# Patient Record
Sex: FEMALE | Race: BLACK OR AFRICAN AMERICAN | Employment: UNEMPLOYED | URBAN - METROPOLITAN AREA
[De-identification: names, ages, dates, MRNs, and addresses within clinical notes are randomized per-mention and may not be internally consistent; named-entity substitution may affect disease eponyms.]

---

## 2021-09-13 ENCOUNTER — HOSPITAL ENCOUNTER (EMERGENCY)
Age: 51
Discharge: HOME | End: 2021-09-16
Attending: EMERGENCY MEDICINE
Payer: MEDICAID

## 2021-09-13 DIAGNOSIS — S00.83XA CONTUSION OF FOREHEAD, INITIAL ENCOUNTER: ICD-10-CM

## 2021-09-13 DIAGNOSIS — R44.0 AUDITORY HALLUCINATIONS: Primary | ICD-10-CM

## 2021-09-13 DIAGNOSIS — F25.9 SCHIZOAFFECTIVE DISORDER, UNSPECIFIED TYPE (HCC): ICD-10-CM

## 2021-09-13 DIAGNOSIS — S60.419A ABRASION OF FINGER OF LEFT HAND, INITIAL ENCOUNTER: ICD-10-CM

## 2021-09-13 LAB
ALBUMIN SERPL-MCNC: 3.8 G/DL (ref 3.5–5)
ALBUMIN/GLOB SERPL: 0.8 {RATIO} (ref 1.2–3.5)
ALP SERPL-CCNC: 73 U/L (ref 50–136)
ALT SERPL-CCNC: 15 U/L (ref 12–65)
AMPHET UR QL SCN: NEGATIVE
ANION GAP SERPL CALC-SCNC: 15 MMOL/L (ref 7–16)
AST SERPL-CCNC: 16 U/L (ref 15–37)
BARBITURATES UR QL SCN: NEGATIVE
BASOPHILS # BLD: 0 K/UL (ref 0–0.2)
BASOPHILS NFR BLD: 0 % (ref 0–2)
BENZODIAZ UR QL: NEGATIVE
BILIRUB SERPL-MCNC: 0.3 MG/DL (ref 0.2–1.1)
BUN SERPL-MCNC: 19 MG/DL (ref 6–23)
CALCIUM SERPL-MCNC: 10.5 MG/DL (ref 8.3–10.4)
CANNABINOIDS UR QL SCN: NEGATIVE
CHLORIDE SERPL-SCNC: 102 MMOL/L (ref 98–107)
CO2 SERPL-SCNC: 26 MMOL/L (ref 21–32)
COCAINE UR QL SCN: NEGATIVE
CREAT SERPL-MCNC: 0.64 MG/DL (ref 0.6–1)
DIFFERENTIAL METHOD BLD: ABNORMAL
EOSINOPHIL # BLD: 0 K/UL (ref 0–0.8)
EOSINOPHIL NFR BLD: 0 % (ref 0.5–7.8)
ERYTHROCYTE [DISTWIDTH] IN BLOOD BY AUTOMATED COUNT: 13 % (ref 11.9–14.6)
GLOBULIN SER CALC-MCNC: 4.9 G/DL (ref 2.3–3.5)
GLUCOSE SERPL-MCNC: 115 MG/DL (ref 65–100)
HCG UR QL: NEGATIVE
HCT VFR BLD AUTO: 46.9 % (ref 35.8–46.3)
HGB BLD-MCNC: 14.7 G/DL (ref 11.7–15.4)
IMM GRANULOCYTES # BLD AUTO: 0 K/UL (ref 0–0.5)
IMM GRANULOCYTES NFR BLD AUTO: 0 % (ref 0–5)
LYMPHOCYTES # BLD: 3 K/UL (ref 0.5–4.6)
LYMPHOCYTES NFR BLD: 36 % (ref 13–44)
MCH RBC QN AUTO: 32.4 PG (ref 26.1–32.9)
MCHC RBC AUTO-ENTMCNC: 31.3 G/DL (ref 31.4–35)
MCV RBC AUTO: 103.3 FL (ref 79.6–97.8)
METHADONE UR QL: NEGATIVE
MONOCYTES # BLD: 0.9 K/UL (ref 0.1–1.3)
MONOCYTES NFR BLD: 10 % (ref 4–12)
NEUTS SEG # BLD: 4.4 K/UL (ref 1.7–8.2)
NEUTS SEG NFR BLD: 53 % (ref 43–78)
NRBC # BLD: 0 K/UL (ref 0–0.2)
OPIATES UR QL: NEGATIVE
PCP UR QL: NEGATIVE
PLATELET # BLD AUTO: 192 K/UL (ref 150–450)
PMV BLD AUTO: 9.7 FL (ref 9.4–12.3)
POTASSIUM SERPL-SCNC: 4 MMOL/L (ref 3.5–5.1)
PROT SERPL-MCNC: 8.7 G/DL (ref 6.3–8.2)
RBC # BLD AUTO: 4.54 M/UL (ref 4.05–5.2)
SODIUM SERPL-SCNC: 143 MMOL/L (ref 136–145)
VALPROATE SERPL-MCNC: 86 UG/ML (ref 50–100)
WBC # BLD AUTO: 8.3 K/UL (ref 4.3–11.1)

## 2021-09-13 PROCEDURE — 80164 ASSAY DIPROPYLACETIC ACD TOT: CPT

## 2021-09-13 PROCEDURE — 80307 DRUG TEST PRSMV CHEM ANLYZR: CPT

## 2021-09-13 PROCEDURE — 85025 COMPLETE CBC W/AUTO DIFF WBC: CPT

## 2021-09-13 PROCEDURE — 80053 COMPREHEN METABOLIC PANEL: CPT

## 2021-09-13 PROCEDURE — 81003 URINALYSIS AUTO W/O SCOPE: CPT

## 2021-09-13 PROCEDURE — 99285 EMERGENCY DEPT VISIT HI MDM: CPT

## 2021-09-13 PROCEDURE — 81025 URINE PREGNANCY TEST: CPT

## 2021-09-13 RX ORDER — AMANTADINE HYDROCHLORIDE 100 MG/1
100 CAPSULE, GELATIN COATED ORAL EVERY 12 HOURS
Status: DISCONTINUED | OUTPATIENT
Start: 2021-09-14 | End: 2021-09-16 | Stop reason: HOSPADM

## 2021-09-13 RX ORDER — FAMOTIDINE 20 MG/1
20 TABLET, FILM COATED ORAL 2 TIMES DAILY
COMMUNITY

## 2021-09-13 RX ORDER — METOPROLOL SUCCINATE 25 MG/1
25 TABLET, EXTENDED RELEASE ORAL DAILY
COMMUNITY

## 2021-09-13 RX ORDER — INSULIN GLARGINE 100 [IU]/ML
10 INJECTION, SOLUTION SUBCUTANEOUS DAILY
Status: DISCONTINUED | OUTPATIENT
Start: 2021-09-14 | End: 2021-09-16 | Stop reason: HOSPADM

## 2021-09-13 RX ORDER — AMANTADINE HYDROCHLORIDE 100 MG/1
100 CAPSULE, GELATIN COATED ORAL 2 TIMES DAILY
COMMUNITY

## 2021-09-13 RX ORDER — DIVALPROEX SODIUM 500 MG/1
750 TABLET, DELAYED RELEASE ORAL 2 TIMES DAILY
COMMUNITY

## 2021-09-13 RX ORDER — QUETIAPINE FUMARATE 400 MG/1
400 TABLET, FILM COATED ORAL 2 TIMES DAILY
COMMUNITY

## 2021-09-13 RX ORDER — DOCUSATE SODIUM 100 MG/1
100 CAPSULE, LIQUID FILLED ORAL
COMMUNITY

## 2021-09-13 RX ORDER — RISPERIDONE 0.5 MG/1
0.5 TABLET, FILM COATED ORAL
COMMUNITY

## 2021-09-13 RX ORDER — METOPROLOL SUCCINATE 50 MG/1
25 TABLET, EXTENDED RELEASE ORAL DAILY
Status: DISCONTINUED | OUTPATIENT
Start: 2021-09-14 | End: 2021-09-16 | Stop reason: HOSPADM

## 2021-09-13 RX ORDER — OMEPRAZOLE 20 MG/1
20 CAPSULE, DELAYED RELEASE ORAL 2 TIMES DAILY
COMMUNITY

## 2021-09-13 RX ORDER — LANOLIN ALCOHOL/MO/W.PET/CERES
500 CREAM (GRAM) TOPICAL DAILY
COMMUNITY

## 2021-09-13 RX ORDER — ERGOCALCIFEROL 1.25 MG/1
50000 CAPSULE ORAL
COMMUNITY

## 2021-09-13 RX ORDER — CLONAZEPAM 0.5 MG/1
0.5 TABLET ORAL
Status: DISCONTINUED | OUTPATIENT
Start: 2021-09-13 | End: 2021-09-16 | Stop reason: HOSPADM

## 2021-09-13 RX ORDER — QUETIAPINE FUMARATE 100 MG/1
400 TABLET, FILM COATED ORAL 2 TIMES DAILY
Status: DISCONTINUED | OUTPATIENT
Start: 2021-09-13 | End: 2021-09-16 | Stop reason: HOSPADM

## 2021-09-13 RX ORDER — CLONAZEPAM 0.5 MG/1
1 TABLET ORAL 2 TIMES DAILY
COMMUNITY

## 2021-09-13 RX ORDER — CALCIUM CARBONATE 200(500)MG
1 TABLET,CHEWABLE ORAL
COMMUNITY

## 2021-09-13 RX ORDER — FAMOTIDINE 20 MG/1
20 TABLET, FILM COATED ORAL 2 TIMES DAILY
Status: DISCONTINUED | OUTPATIENT
Start: 2021-09-14 | End: 2021-09-16 | Stop reason: HOSPADM

## 2021-09-13 RX ORDER — FERROUS SULFATE 325(65) MG
325 TABLET, DELAYED RELEASE (ENTERIC COATED) ORAL
COMMUNITY

## 2021-09-13 RX ORDER — ROSUVASTATIN CALCIUM 10 MG/1
10 TABLET, COATED ORAL
COMMUNITY

## 2021-09-13 RX ORDER — RISPERIDONE 1 MG/1
0.5 TABLET, FILM COATED ORAL DAILY
Status: DISCONTINUED | OUTPATIENT
Start: 2021-09-14 | End: 2021-09-16 | Stop reason: HOSPADM

## 2021-09-13 NOTE — ED TRIAGE NOTES
Pt [facility] from [address], states that she was upset with her staff and started banging her head on wall and scratching her hand. Staff states patient needs psych evaluation.

## 2021-09-13 NOTE — ED PROVIDER NOTES
79-year-old female brought in by caretaker. She is in a group home. Diagnoses include hypertension diabetes and schizoaffective disorder. Patient comes in today because of agitation. She states she was screaming and banging her head on the wall because she did not get her way. Caretaker with the patient states that she has had other aggressive behavior as far as throwing things, trying to lock staff out and threatening behavior. No changes in medications recently. Patient denies suicidal ideations or attempts. There is some question of the patient having auditory hallucinations with demons telling her to do these things. Caretaker with the patient states as far as psychoactive medications, she is on Klonopin, Depakote, Seroquel, Risperdal, amantadine. Old records reviewed. Patient is in another hospital for self-induced abrasion to left dorsum of the hand yesterday which patient admits to doing because she was frustrated. Old records reveals another emergency department visit approximately 6 months ago due to agitation at that time as well. The history is provided by the patient. Mental Health Problem   The current episode started 1 to 2 hours ago. The problem has been gradually improving. Associated symptoms include agitation, hallucinations (Possible auditory), self-injury and violence. Mental status baseline is normal.  Her past medical history is significant for diabetes and hypertension. Her past medical history does not include seizures, CVA, dementia or heart disease. No past medical history on file. No past surgical history on file. No family history on file.     Social History     Socioeconomic History    Marital status: Not on file     Spouse name: Not on file    Number of children: Not on file    Years of education: Not on file    Highest education level: Not on file   Occupational History    Not on file   Tobacco Use    Smoking status: Not on file   Substance and Sexual Activity    Alcohol use: Not on file    Drug use: Not on file    Sexual activity: Not on file   Other Topics Concern    Not on file   Social History Narrative    Not on file     Social Determinants of Health     Financial Resource Strain:     Difficulty of Paying Living Expenses:    Food Insecurity:     Worried About Running Out of Food in the Last Year:     [address] in the Last Year:    Transportation Needs:     Lack of Transportation (Medical):  Lack of Transportation (Non-Medical):    Physical Activity:     Days of Exercise per Week:     Minutes of Exercise per Session:    Stress:     Feeling of Stress :    Social Connections:     Frequency of Communication with Friends and Family:     Frequency of Social Gatherings with Friends and Family:     Attends [employer] Services:     Active Member of Clubs or Organizations:     Attends Club or Organization Meetings:     Marital Status:    Intimate Partner Violence:     Fear of Current or Ex-Partner:     Emotionally Abused:     Physically Abused:     Sexually Abused: ALLERGIES: Patient has no known allergies. Review of Systems   Constitutional: Negative for chills and fever. Respiratory: Negative for cough and shortness of breath. Cardiovascular: Negative for chest pain and palpitations. Gastrointestinal: Negative for abdominal pain, diarrhea and vomiting. Genitourinary: Negative for flank pain and frequency. Skin: Negative for color change. Psychiatric/Behavioral: Positive for agitation, behavioral problems, decreased concentration, hallucinations (Possible auditory) and self-injury. Negative for suicidal ideas. All other systems reviewed and are negative. Vitals:    09/13/21 1714   BP: 128/79   Pulse: (!) 119   Resp: 18   Temp: 99.4 °F (37.4 °C)   SpO2: 96%   Weight: 54.4 kg (120 lb)   Height: 5' 3\" (1.6 m)            Physical Exam  Vitals and nursing note reviewed.    Constitutional:       General: She is not in acute distress. Appearance: She is well-developed. HENT:      Head: Normocephalic and atraumatic. Right Ear: External ear normal.      Left Ear: External ear normal.      Mouth/Throat:      Pharynx: No oropharyngeal exudate. Eyes:      General: No scleral icterus. Conjunctiva/sclera: Conjunctivae normal.      Pupils: Pupils are equal, round, and reactive to light. Cardiovascular:      Rate and Rhythm: Normal rate and regular rhythm. Heart sounds: No murmur heard. Pulmonary:      Effort: No respiratory distress. Breath sounds: Normal breath sounds. Skin:     General: Skin is warm and dry. Neurological:      Mental Status: She is alert and oriented to person, place, and time. Gait: Gait normal.      Comments: Nl speech   Psychiatric:         Attention and Perception: Attention normal.         Mood and Affect: Affect is flat. Speech: Speech normal.         Behavior: Behavior is cooperative. Thought Content: Thought content does not include suicidal ideation. Thought content does not include suicidal plan. Comments: No obvious hallucinations noted. MDM  Number of Diagnoses or Management Options  Diagnosis management comments: Schizoaffective patient with some behavioral issues including self injury that is not suicidal in nature. Perhaps some danger to others as well. Will attempt psychiatric interview. Screen for pregnancy or electrolyte abnormalities.        Amount and/or Complexity of Data Reviewed  Clinical lab tests: ordered and reviewed  Discuss the patient with other providers: yes    Risk of Complications, Morbidity, and/or Mortality  Presenting problems: moderate  Diagnostic procedures: minimal  Management options: low    Patient Progress  Patient progress: stable         Procedures    Results Include:    Recent Results (from the past 24 hour(s))   CBC WITH AUTOMATED DIFF    Collection Time: 09/13/21  6:22 PM   Result Value Ref Range    WBC 8.3 4.3 - 11.1 K/uL    RBC 4.54 4.05 - 5.2 M/uL    HGB 14.7 11.7 - 15.4 g/dL    HCT 46.9 (H) 35.8 - 46.3 %    .3 (H) 79.6 - 97.8 FL    MCH 32.4 26.1 - 32.9 PG    MCHC 31.3 (L) 31.4 - 35.0 g/dL    RDW 13.0 11.9 - 14.6 %    PLATELET 912 759 - 581 K/uL    MPV 9.7 9.4 - 12.3 FL    ABSOLUTE NRBC 0.00 0.0 - 0.2 K/uL    DF AUTOMATED      NEUTROPHILS 53 43 - 78 %    LYMPHOCYTES 36 13 - 44 %    MONOCYTES 10 4.0 - 12.0 %    EOSINOPHILS 0 (L) 0.5 - 7.8 %    BASOPHILS 0 0.0 - 2.0 %    IMMATURE GRANULOCYTES 0 0.0 - 5.0 %    ABS. NEUTROPHILS 4.4 1.7 - 8.2 K/UL    ABS. LYMPHOCYTES 3.0 0.5 - 4.6 K/UL    ABS. MONOCYTES 0.9 0.1 - 1.3 K/UL    ABS. EOSINOPHILS 0.0 0.0 - 0.8 K/UL    ABS. BASOPHILS 0.0 0.0 - 0.2 K/UL    ABS. IMM. GRANS. 0.0 0.0 - 0.5 K/UL   METABOLIC PANEL, COMPREHENSIVE    Collection Time: 09/13/21  6:22 PM   Result Value Ref Range    Sodium 143 136 - 145 mmol/L    Potassium 4.0 3.5 - 5.1 mmol/L    Chloride 102 98 - 107 mmol/L    CO2 26 21 - 32 mmol/L    Anion gap 15 7 - 16 mmol/L    Glucose 115 (H) 65 - 100 mg/dL    BUN 19 6 - 23 MG/DL    Creatinine 0.64 0.6 - 1.0 MG/DL    GFR est AA >60 >60 ml/min/1.73m2    GFR est non-AA >60 >60 ml/min/1.73m2    Calcium 10.5 (H) 8.3 - 10.4 MG/DL    Bilirubin, total 0.3 0.2 - 1.1 MG/DL    ALT (SGPT) 15 12 - 65 U/L    AST (SGOT) 16 15 - 37 U/L    Alk.  phosphatase 73 50 - 136 U/L    Protein, total 8.7 (H) 6.3 - 8.2 g/dL    Albumin 3.8 3.5 - 5.0 g/dL    Globulin 4.9 (H) 2.3 - 3.5 g/dL    A-G Ratio 0.8 (L) 1.2 - 3.5     DRUG SCREEN, URINE    Collection Time: 09/13/21  6:22 PM   Result Value Ref Range    PCP(PHENCYCLIDINE) Negative      BENZODIAZEPINES Negative      COCAINE Negative      AMPHETAMINES Negative      METHADONE Negative      THC (TH-CANNABINOL) Negative      OPIATES Negative      BARBITURATES Negative     VALPROIC ACID    Collection Time: 09/13/21  6:22 PM   Result Value Ref Range    Valproic acid 86 50 - 100 ug/ml   HCG URINE, QL. - POC    Collection Time: 09/13/21  6:35 PM   Result Value Ref Range    Pregnancy test,urine (POC) Negative NEG       Psychiatry is interviewed the patient and felt she is a danger to self and others. Patient still have hallucinations. Recommendations of involuntary hospitalization and medication recommendations listed. Medications from doctor's office listed. Seems to be a conflict and the psychiatric doctor recommended continuing previous medications but the dose of Depakote was different     Current Outpatient Medications   Medication Sig Dispense Refill    amantadine (SYMMETREL) 100 mg capsule Take 100 mg 2 (two) times a day.  blood sugar diagnostic by Miscellaneous route 2 (two) times a week Test strips of patient choice. 90 days supply 3    blood-glucose meter kit by Miscellaneous route 2 (two) times a week. Check BS twice weekly on Wednesday and Friday. Glucometer of pt choice. 1 each 0    calcium carbonate (Tums) 200 mg calcium (500 mg) chewable tablet Take 1 tablet (500 mg) by mouth 2 (two) times a day as needed for indigestion or heartburn 90 tablet 3    clonazePAM (KlonoPIN) 1 mg tablet Take 1 mg by mouth 2 (two) times a day.     cyanocobalamin (vitamin B-12) 500 mcg tablet Take 1 tablet (500 mcg) by mouth daily 90 tablet 3    dapagliflozin (Farxiga) 10 mg Take 10 mg by mouth daily 90 tablet 3    divalproex (DEPAKOTE) 500 mg EC tablet Take 750 mg by mouth 2 (two) times a day        docusate sodium (COLACE) 100 mg capsule Take 1 capsule (100 mg) daily as needed for constipation 90 capsule 3    emollient combination no.72 (Eucerin Intensive Repair) Apply topically 3 (three) times a day as needed (dry skin) 750 mL 3    ergocalciferol (Vitamin D2) 1,250 mcg (50,000 unit) capsule Take 1 capsule (50,000 Units) every 14 (fourteen) days 6 capsule 3    famotidine (Pepcid) 20 mg tablet Take 1 tablet (20 mg) by mouth 2 (two) times a day 180 tablet 3    ferrous sulfate 325 mg (65 mg iron) tablet Take 1 tablet (325 mg) by mouth daily 90 tablet 3    honey 80% (MEDIHONEY) 80 % topical gel Apply topically daily as needed (wound/skin tear) 44 mL 3    magnesium citrate 1.745 g/oz solution Per colonoscopy instructions 2 Bottle 0    metoprolol (TOPROL-XL) 25 mg 24 hr tablet Take 1 tablet (25 mg) by mouth daily 90 tablet 3    omeprazole (PriLOSEC) 20 MG capsule Take 1 capsule (20 mg) by mouth 2 (two) times a day 180 capsule 3    polyethylene glycol (Gavilyte-C) 240-22.72-6.72 -5.84 gram solution As per instructions 1 Bottle 0    polyethylene glycol (MIRALAX) 17 gram/dose powder Take 235 g by mouth once for 1 dose As per colonoscopy prep instructions 235 g 0    QUEtiapine (SEROquel) 400 MG tablet Take 400 mg by mouth 2 (two) times a day.     risperiDONE (RisperDAL) 0.5 mg tablet Take by mouth    rosuvastatin (Crestor) 10 MG tablet Take 1 tablet (10 mg) by mouth nightly 90 tablet 3    SITagliptin (JANUVIA) 100 mg tablet Take 1 tablet (100 mg) by mouth daily 90 tablet 3

## 2021-09-14 LAB
GLUCOSE BLD STRIP.AUTO-MCNC: 114 MG/DL (ref 65–100)
GLUCOSE BLD STRIP.AUTO-MCNC: 93 MG/DL (ref 65–100)
SERVICE CMNT-IMP: ABNORMAL
SERVICE CMNT-IMP: NORMAL

## 2021-09-14 PROCEDURE — 82962 GLUCOSE BLOOD TEST: CPT

## 2021-09-14 PROCEDURE — 74011636637 HC RX REV CODE- 636/637: Performed by: EMERGENCY MEDICINE

## 2021-09-14 PROCEDURE — 74011250637 HC RX REV CODE- 250/637: Performed by: EMERGENCY MEDICINE

## 2021-09-14 RX ADMIN — DIVALPROEX SODIUM 750 MG: 500 TABLET, DELAYED RELEASE ORAL at 02:07

## 2021-09-14 RX ADMIN — AMANTADINE HYDROCHLORIDE 100 MG: 100 CAPSULE, GELATIN COATED ORAL at 02:06

## 2021-09-14 RX ADMIN — FAMOTIDINE 20 MG: 20 TABLET, FILM COATED ORAL at 08:51

## 2021-09-14 RX ADMIN — CLONAZEPAM 0.5 MG: 0.5 TABLET ORAL at 00:16

## 2021-09-14 RX ADMIN — RISPERIDONE 0.5 MG: 1 TABLET ORAL at 08:51

## 2021-09-14 RX ADMIN — DIVALPROEX SODIUM 750 MG: 500 TABLET, DELAYED RELEASE ORAL at 08:51

## 2021-09-14 RX ADMIN — AMANTADINE HYDROCHLORIDE 100 MG: 100 CAPSULE, GELATIN COATED ORAL at 08:51

## 2021-09-14 RX ADMIN — QUETIAPINE FUMARATE 400 MG: 100 TABLET ORAL at 00:16

## 2021-09-14 RX ADMIN — QUETIAPINE FUMARATE 400 MG: 100 TABLET ORAL at 08:50

## 2021-09-14 RX ADMIN — INSULIN GLARGINE 10 UNITS: 100 INJECTION, SOLUTION SUBCUTANEOUS at 09:02

## 2021-09-14 RX ADMIN — METOPROLOL SUCCINATE 25 MG: 50 TABLET, EXTENDED RELEASE ORAL at 08:51

## 2021-09-14 NOTE — PROGRESS NOTES
[address]                                                                                                 PATIENT INFORMATION   Patient Name: Nicole Berger: [de-identified] Patient MRN: [id number]   Address: [address] [place] [address] [address] [address] Patient CSN: [id number]      Roman Catholic: Mu-ism   Sex: Female Marital Status:    : 1970 Age:   54 yrs   Home Phone: [phone]  Mobile Phone:   [phone]        Race: BLACK/ Employer:   Not Employed   Language: ENGLISH Admitted/Arrived From:   Other [[id number]]   ADMISSION INFORMATION   Admit Date: 2021 Admit Time:  5:36 PM   Patient Class: Emergency Service: Emergency   Admit Source: Other type of health car* Admit Type: EMERGENCY   Admitting Provider:   Attending Provider: Araceli Chapa   Unit: [facility] Emergency Dept Room/Bed: ER15/15    Admission Diagnosis:  and codes:      Emergency Complaint: Head Injury (EMS)                Discharge Date:   Discharge Time:     GUARANTOR INFORMATION   Name:  Lorena Toney Address: [address] [place] [address]  Rel:  Self   Phone: [address] [facility], [address] : 1970   EMERGENCY CONTACTS   Name: Yue Lay:  Mobile:    [phone] Rel: Brother   COVERAGE INFORMATION   Primary Insurance:   [address] [place] [address] Rd: Silviano Crawford   Plan Name: [id number] Cindy Davis Pt Rel to Subscriber: Self [[id number]]   Claim Address: [place] [address] Sex: Female      Policy #:  [id number]    Group #:   Group Name:       Auth #: N/A Ins Phone:         Secondary Insurance:   Subscriber:     Plan Name:   Pt Rel to Subscriber:     Claim Address: NA Sex:       Policy #: N/A    Group #: N/A Group Name: N/A   Auth #: N/A Ins Phone:         Accident Date:    Accident Type:     PROVIDER INFORMATION   PCP:         Caridad Duran MD PCP Phone:  [phone]   Referring Prov:   No ref.  provider found Referring Phone:  Referring Fax:  N/A      Advanced Directive:  Not Received Research:     Lab Client:   Enrollment Status:          Printed on 21 10:22 AM Page      CM met with patient at bedside. Patient is alert and oriented. Patient denies SI/HI. Denies wanting to hurt herself, despite injuries on her arm and head. Patient states she is feeling good right now. CM spoke with group home leader Edil Krishnamurthy, patient was recently dc from [address]. Patient has a hx of schizoaffective disorder and IDD. Patient has been living in a group home for several years, [facility] ([phone], [address] Patient has a  with [place] [address] ([facility] [phone]) Parents are , no children, no spouse. Patient's sees a psychiatrist through [address] (CM unable to reach MD). Referral sent to   [facility] No available beds  [facility] VM left   [facility] No beds  [facility] Referral has been not reviewed   [facility] left   [facility] No Medicaid beds  Patient does not have Medicare. Patient has no hx of IP placement. Patient is able to return to group home once medically stable. CM to continue to follow.        Care Management Interventions  Transition of Care Consult (CM Consult): Discharge Planning  Discharge Durable Medical Equipment: No  Physical Therapy Consult: No  Occupational Therapy Consult: No  Speech Therapy Consult: No  Support Systems: /, Adult Group Home  Confirm Follow Up Transport: Family  The Plan for Transition of Care is Related to the Following Treatment Goals : Group Home  The Patient and/or Patient Representative was Provided with a Choice of Provider and Agrees with the Discharge Plan?: Yes  Name of the Patient Representative Who was Provided with a Choice of Provider and Agrees with the Discharge Plan: Patient & caregiver  Freedom of Choice List was Provided with Basic Dialogue that Supports the Patient's Individualized Plan of Care/Goals, Treatment Preferences and Shares the Quality Data Associated with the Providers?: Yes  Discharge Location  Discharge Placement: Group home,i

## 2021-09-15 LAB
GLUCOSE BLD STRIP.AUTO-MCNC: 117 MG/DL (ref 65–100)
GLUCOSE BLD STRIP.AUTO-MCNC: 156 MG/DL (ref 65–100)
SERVICE CMNT-IMP: ABNORMAL
SERVICE CMNT-IMP: ABNORMAL

## 2021-09-15 PROCEDURE — 82962 GLUCOSE BLOOD TEST: CPT

## 2021-09-15 PROCEDURE — 74011636637 HC RX REV CODE- 636/637: Performed by: EMERGENCY MEDICINE

## 2021-09-15 PROCEDURE — 74011250637 HC RX REV CODE- 250/637: Performed by: EMERGENCY MEDICINE

## 2021-09-15 RX ADMIN — AMANTADINE HYDROCHLORIDE 100 MG: 100 CAPSULE, GELATIN COATED ORAL at 02:49

## 2021-09-15 RX ADMIN — FAMOTIDINE 20 MG: 20 TABLET, FILM COATED ORAL at 18:21

## 2021-09-15 RX ADMIN — QUETIAPINE FUMARATE 400 MG: 100 TABLET ORAL at 21:44

## 2021-09-15 RX ADMIN — AMANTADINE HYDROCHLORIDE 100 MG: 100 CAPSULE, GELATIN COATED ORAL at 21:42

## 2021-09-15 RX ADMIN — AMANTADINE HYDROCHLORIDE 100 MG: 100 CAPSULE, GELATIN COATED ORAL at 09:58

## 2021-09-15 RX ADMIN — QUETIAPINE FUMARATE 400 MG: 100 TABLET ORAL at 09:58

## 2021-09-15 RX ADMIN — RISPERIDONE 0.5 MG: 1 TABLET ORAL at 09:58

## 2021-09-15 RX ADMIN — METOPROLOL SUCCINATE 25 MG: 50 TABLET, EXTENDED RELEASE ORAL at 09:58

## 2021-09-15 RX ADMIN — DIVALPROEX SODIUM 750 MG: 500 TABLET, DELAYED RELEASE ORAL at 21:42

## 2021-09-15 RX ADMIN — FAMOTIDINE 20 MG: 20 TABLET, FILM COATED ORAL at 09:58

## 2021-09-15 RX ADMIN — DIVALPROEX SODIUM 750 MG: 500 TABLET, DELAYED RELEASE ORAL at 02:48

## 2021-09-15 RX ADMIN — DIVALPROEX SODIUM 750 MG: 500 TABLET, DELAYED RELEASE ORAL at 09:57

## 2021-09-15 RX ADMIN — CLONAZEPAM 0.5 MG: 0.5 TABLET ORAL at 21:44

## 2021-09-15 RX ADMIN — INSULIN GLARGINE 10 UNITS: 100 INJECTION, SOLUTION SUBCUTANEOUS at 09:57

## 2021-09-15 NOTE — PROGRESS NOTES
Problem: Anxiety-Behavioral Health (Adult/Pediatric)  Goal: *STG: Participates in treatment plan  Outcome: Progressing Towards Goal  Goal: *STG: Remains safe in hospital  Outcome: Progressing Towards Goal  Goal: *STG: Seeks staff when feelings of anxiety and fear arise  Outcome: Progressing Towards Goal  Goal: *STG: Attends activities and groups  Outcome: Progressing Towards Goal  Goal: *STG: Demonstrates decrease in ritualistic behavior  Outcome: Progressing Towards Goal  Goal: *STG: Demonstrates effective anxiety reduction strategies  Outcome: Progressing Towards Goal  Goal: *STG/LTG: Trigger identification  Outcome: Progressing Towards Goal  Goal: *STG/LTG: Complies with medication therapy  Outcome: Progressing Towards Goal  Goal: *LTG:  Verbalizes understanding of personal adaptive level of functioning  Outcome: Progressing Towards Goal  Goal: Interventions  Outcome: Progressing Towards Goal     Problem: Patient Education: Go to Patient Education Activity  Goal: Patient/Family Education  Outcome: Progressing Towards Goal

## 2021-09-15 NOTE — ED NOTES
Constant Observer Yes - Name: .   Constant Observer Oriented YES   High risk patients are in line of sight at all times Yes   Excess equipment/medical supplies not necessary for the care of the patient removed Yes   All sharp or dangerous objects are removed from room: including but not limited to belts, pens & pencils, needles, medications, cosmetics, lighters, matches, nail files, watches, necklaces, glass objects, razors, razor blades, knives, aerosol sprays, drawstring pants, shoes, cords (telephone, call bells, etc.) cleaning wipes or other cleaning items, aluminum cans, not permanently attached wall décor Yes   Telephone/cell phone removed as well as TV remote (batteries can be swallowed) Yes   Patient belongings removed and labeled at nurses station Yes   Excess linen is removed from room Yes   All plastic bags are removed from the room and replaced with paper trash bags Yes   Patient is in paper scrubs or appropriate gown and using hospital socks with rubber soles Yes   No metal, hard eating utensils or hard plates are on meal tray Yes   Remove all cleaning agents used by [employer] Yes   If Crucifix is hanging on a nail, remove Crucifix as well as the nail Yes       *If any question above is answered \"No,\" documentation is required.

## 2021-09-15 NOTE — ED NOTES
[facility] Psychiatric RECHECK NOTE for 9/15/2021  Arrival Date/Time: 2021  5:36 PM      Stacey Garcia  MRN: [id number]    YOB: 1970   54 y.o. female    [facility] EMERGENCY DEPT ER15/15  Seen on 9/15/2021 @ 11:01 AM        she is on papers. Commitment Papers  on: 21    she has completed a tele-psych evaluation. 21     Stacey Garcia is a 54 y.o. female here for agitation, self injury, hallucinations. Objective:   Vitals:    21 0051 21 0851 09/15/21 0956 09/15/21 0957   BP: (!) 103/58 129/79 135/89 135/89   Pulse: (!) 110 (!) 101 (!) 118 (!) 118   Resp: 16  17    Temp:  97.6 °F (36.4 °C) 98.2 °F (36.8 °C)    SpO2: 97% 99% 99%        Recent Labs:   Recent Labs     21  1822      K 4.0      CO2 26   BUN 19   CREA 0.64   CA 10.5*   *      No lab exists for component: UDS,  [place]    Current Facility-Administered Medications   Medication Dose Route Frequency    divalproex DR (DEPAKOTE) tablet 750 mg  750 mg Oral Q12H    clonazePAM (KlonoPIN) tablet 0.5 mg  0.5 mg Oral QHS    amantadine HCL (SYMMETREL) capsule 100 mg  100 mg Oral Q12H    famotidine (PEPCID) tablet 20 mg  20 mg Oral BID    metoprolol succinate (TOPROL-XL) XL tablet 25 mg  25 mg Oral DAILY    QUEtiapine (SEROquel) tablet 400 mg  400 mg Oral BID    risperiDONE (RisperDAL) tablet 0.5 mg  0.5 mg Oral DAILY    insulin glargine (LANTUS) injection 10 Units  10 Units SubCUTAneous DAILY     Current Outpatient Medications   Medication Sig    amantadine HCL (SYMMETREL) 100 mg capsule Take 100 mg by mouth two (2) times a day.  calcium carbonate (TUMS) 200 mg calcium (500 mg) chew Take 1 Tablet by mouth two (2) times daily as needed.  clonazePAM (KlonoPIN) 0.5 mg tablet Take 1 mg by mouth two (2) times a day.  cyanocobalamin (Vitamin B-12) 500 mcg tablet Take 500 mcg by mouth daily.  divalproex DR (DEPAKOTE) 500 mg tablet Take 750 mg by mouth two (2) times a day.     docusate sodium (Colace) 100 mg capsule Take 100 mg by mouth two (2) times daily as needed for Constipation.  ergocalciferol (Vitamin D2) 1,250 mcg (50,000 unit) capsule Take 50,000 Units by mouth every fourteen (14) days.  famotidine (Pepcid) 20 mg tablet Take 20 mg by mouth two (2) times a day.  ferrous sulfate (IRON) 325 mg (65 mg iron) EC tablet Take 325 mg by mouth three (3) times daily (with meals).  metoprolol succinate (TOPROL-XL) 25 mg XL tablet Take 25 mg by mouth daily.  omeprazole (PRILOSEC) 20 mg capsule Take 20 mg by mouth two (2) times a day.  QUEtiapine (SEROqueL) 400 mg tablet Take 400 mg by mouth two (2) times a day.  risperiDONE (RisperDAL) 0.5 mg tablet Take 0.5 mg by mouth nightly.  rosuvastatin (Crestor) 10 mg tablet Take 10 mg by mouth nightly.  SITagliptin (Januvia) 100 mg tablet Take 100 mg by mouth daily.  dapagliflozin (Farxiga) 10 mg tab tablet Take 10 mg by mouth daily. Assessment and Plan:  Schizoaffective disorder with increased agitation and self injury. Doing well on medications in ED. No complaints today. Awaiting records from her psychiatrist. Plan to dc back to group home with possible one on one care.     Kiran Stern MD; 9/15/2021 @11:01 AM ===============

## 2021-09-15 NOTE — PROGRESS NOTES
Care Management Interventions  Transition of Care Consult (CM Consult): Discharge Planning  Discharge Durable Medical Equipment: No  Physical Therapy Consult: No  Occupational Therapy Consult: No  Speech Therapy Consult: No  Support Systems: /, Adult Group Home  Confirm Follow Up Transport: Family  The Plan for Transition of Care is Related to the Following Treatment Goals : Group Home  The Patient and/or Patient Representative was Provided with a Choice of Provider and Agrees with the Discharge Plan?: Yes  Name of the Patient Representative Who was Provided with a Choice of Provider and Agrees with the Discharge Plan: Patient & caregiver  Freedom of Choice List was Provided with Basic Dialogue that Supports the Patient's Individualized Plan of Care/Goals, Treatment Preferences and Shares the Quality Data Associated with the Providers?: Yes  Discharge Location  Discharge Placement: Group home      FLACO spoke with DEANNA- Morelia Talavera at psychiatrist office of Dr. Timmy Maloney. Patient is well known to the practice, they have been treating her for several years. Patient has had no med adjustments since December 2020. NP reports patient having passive SI thoughts but not acted on thoughts. Patient's diet was recently changed to Diabetic with insulin regimen, this has caused the patient distress. Patient is normally very cooperative, this behavior is inconsistent with her usual behavior. FLACO requested records from Morelia Talavera NP. MAC faxed to NP. CM to continue to follow.

## 2021-09-16 VITALS
HEIGHT: 63 IN | HEART RATE: 98 BPM | WEIGHT: 120 LBS | BODY MASS INDEX: 21.26 KG/M2 | TEMPERATURE: 97.7 F | OXYGEN SATURATION: 98 % | RESPIRATION RATE: 16 BRPM | DIASTOLIC BLOOD PRESSURE: 71 MMHG | SYSTOLIC BLOOD PRESSURE: 108 MMHG

## 2021-09-16 LAB
GLUCOSE BLD STRIP.AUTO-MCNC: 123 MG/DL (ref 65–100)
SERVICE CMNT-IMP: ABNORMAL

## 2021-09-16 PROCEDURE — 82962 GLUCOSE BLOOD TEST: CPT

## 2021-09-16 PROCEDURE — 74011250637 HC RX REV CODE- 250/637: Performed by: EMERGENCY MEDICINE

## 2021-09-16 PROCEDURE — 74011636637 HC RX REV CODE- 636/637: Performed by: EMERGENCY MEDICINE

## 2021-09-16 RX ADMIN — QUETIAPINE FUMARATE 400 MG: 100 TABLET ORAL at 10:35

## 2021-09-16 RX ADMIN — METOPROLOL SUCCINATE 25 MG: 50 TABLET, EXTENDED RELEASE ORAL at 10:35

## 2021-09-16 RX ADMIN — FAMOTIDINE 20 MG: 20 TABLET, FILM COATED ORAL at 10:36

## 2021-09-16 RX ADMIN — AMANTADINE HYDROCHLORIDE 100 MG: 100 CAPSULE, GELATIN COATED ORAL at 10:36

## 2021-09-16 RX ADMIN — DIVALPROEX SODIUM 750 MG: 500 TABLET, DELAYED RELEASE ORAL at 10:36

## 2021-09-16 RX ADMIN — RISPERIDONE 0.5 MG: 1 TABLET ORAL at 10:36

## 2021-09-16 RX ADMIN — INSULIN GLARGINE 10 UNITS: 100 INJECTION, SOLUTION SUBCUTANEOUS at 10:39

## 2021-09-16 NOTE — ED NOTES
I have reviewed discharge instructions with the patient and [facility] transport. The patient and [facility] transport verbalized understanding. Patient left ED via Discharge Method: ambulatory to Home with [facility]. Opportunity for questions and clarification provided. Patient given 0 scripts. No e-sign. To continue your aftercare when you leave the hospital, you may receive an automated call from our care team to check in on how you are doing. This is a free service and part of our promise to provide the best care and service to meet your aftercare needs.  If you have questions, or wish to unsubscribe from this service please call [phone]. Thank you for Choosing our [employer] Emergency Department.

## 2021-09-16 NOTE — DISCHARGE INSTRUCTIONS
Return with any fevers, vomiting, difficulty breathing, worsening symptoms, or additional concerns. Follow-up with your regular doctors as planned. No

## 2021-09-16 NOTE — ED NOTES
Subjective: 68-year-old lady says she has no current concerns. She says she otherwise feels good. He said he does not feel agitated or scared. Objective: Vital signs reviewed. She is mildly tachycardic. She has been persistently tachycardic since being here. She is clear to auscultation bilaterally. She has regular rhythm. She is alert and oriented x3. Assessment: Pleasant 68-year-old lady who has some baseline issues and apparently does not respond well to challenges in change. She is currently stable in the emergency department. I think her tachycardia is likely a combination of not yet having received her morning medications and some anxiety about the situation. Plan: Her previous group home has accepted her back. We will get her morning medications for her and then discharge her.

## 2021-09-16 NOTE — PROGRESS NOTES
Care Management Interventions  Transition of Care Consult (CM Consult): Discharge Planning  Discharge Durable Medical Equipment: No  Physical Therapy Consult: No  Occupational Therapy Consult: No  Speech Therapy Consult: No  Support Systems: /, Adult Group Home  Confirm Follow Up Transport: Family  The Plan for Transition of Care is Related to the Following Treatment Goals : Group Home  The Patient and/or Patient Representative was Provided with a Choice of Provider and Agrees with the Discharge Plan?: Yes  Name of the Patient Representative Who was Provided with a Choice of Provider and Agrees with the Discharge Plan: Patient & caregiver  Freedom of Choice List was Provided with Basic Dialogue that Supports the Patient's Individualized Plan of Care/Goals, Treatment Preferences and Shares the Quality Data Associated with the Providers?: Yes  Discharge Location  Discharge Placement: Group home      Patient to be dc back to group home today. CM spoke with caregiver Reed Saravia. Patient to be transferred via [facility]. Psych NP in the community following patient notified of dc. CM met with pt at bedside, states she is very happy today, no thoughts of hurting herself or others. No further CM needs.

## 2021-12-28 ENCOUNTER — HOSPITAL ENCOUNTER (EMERGENCY)
Age: 51
Discharge: HOME | End: 2021-12-28
Attending: STUDENT IN AN ORGANIZED HEALTH CARE EDUCATION/TRAINING PROGRAM
Payer: MEDICAID

## 2021-12-28 VITALS
DIASTOLIC BLOOD PRESSURE: 82 MMHG | TEMPERATURE: 98 F | HEART RATE: 86 BPM | WEIGHT: 135 LBS | OXYGEN SATURATION: 100 % | HEIGHT: 63 IN | SYSTOLIC BLOOD PRESSURE: 135 MMHG | RESPIRATION RATE: 16 BRPM | BODY MASS INDEX: 23.92 KG/M2

## 2021-12-28 DIAGNOSIS — F25.9 SCHIZOAFFECTIVE DISORDER, UNSPECIFIED TYPE (HCC): Primary | ICD-10-CM

## 2021-12-28 LAB
ALBUMIN SERPL-MCNC: 3.7 G/DL (ref 3.5–5)
ALBUMIN/GLOB SERPL: 0.8 {RATIO} (ref 1.2–3.5)
ALP SERPL-CCNC: 64 U/L (ref 50–136)
ALT SERPL-CCNC: 16 U/L (ref 12–65)
AMPHET UR QL SCN: NEGATIVE
ANION GAP SERPL CALC-SCNC: 4 MMOL/L (ref 7–16)
AST SERPL-CCNC: 22 U/L (ref 15–37)
BARBITURATES UR QL SCN: NEGATIVE
BASOPHILS # BLD: 0 K/UL (ref 0–0.2)
BASOPHILS NFR BLD: 0 % (ref 0–2)
BENZODIAZ UR QL: NEGATIVE
BILIRUB SERPL-MCNC: 0.2 MG/DL (ref 0.2–1.1)
BUN SERPL-MCNC: 17 MG/DL (ref 6–23)
CALCIUM SERPL-MCNC: 10.2 MG/DL (ref 8.3–10.4)
CANNABINOIDS UR QL SCN: NEGATIVE
CHLORIDE SERPL-SCNC: 107 MMOL/L (ref 98–107)
CO2 SERPL-SCNC: 30 MMOL/L (ref 21–32)
COCAINE UR QL SCN: NEGATIVE
CREAT SERPL-MCNC: 0.66 MG/DL (ref 0.6–1)
DIFFERENTIAL METHOD BLD: ABNORMAL
EOSINOPHIL # BLD: 0 K/UL (ref 0–0.8)
EOSINOPHIL NFR BLD: 0 % (ref 0.5–7.8)
ERYTHROCYTE [DISTWIDTH] IN BLOOD BY AUTOMATED COUNT: 14 % (ref 11.9–14.6)
GLOBULIN SER CALC-MCNC: 4.6 G/DL (ref 2.3–3.5)
GLUCOSE SERPL-MCNC: 87 MG/DL (ref 65–100)
HCT VFR BLD AUTO: 43.7 % (ref 35.8–46.3)
HGB BLD-MCNC: 13.5 G/DL (ref 11.7–15.4)
IMM GRANULOCYTES # BLD AUTO: 0 K/UL (ref 0–0.5)
IMM GRANULOCYTES NFR BLD AUTO: 0 % (ref 0–5)
LYMPHOCYTES # BLD: 2.7 K/UL (ref 0.5–4.6)
LYMPHOCYTES NFR BLD: 40 % (ref 13–44)
MCH RBC QN AUTO: 31 PG (ref 26.1–32.9)
MCHC RBC AUTO-ENTMCNC: 30.9 G/DL (ref 31.4–35)
MCV RBC AUTO: 100.5 FL (ref 79.6–97.8)
METHADONE UR QL: NEGATIVE
MONOCYTES # BLD: 0.6 K/UL (ref 0.1–1.3)
MONOCYTES NFR BLD: 9 % (ref 4–12)
NEUTS SEG # BLD: 3.5 K/UL (ref 1.7–8.2)
NEUTS SEG NFR BLD: 51 % (ref 43–78)
NRBC # BLD: 0 K/UL (ref 0–0.2)
OPIATES UR QL: NEGATIVE
PCP UR QL: NEGATIVE
PLATELET # BLD AUTO: 199 K/UL (ref 150–450)
PMV BLD AUTO: 10.2 FL (ref 9.4–12.3)
POTASSIUM SERPL-SCNC: 3.8 MMOL/L (ref 3.5–5.1)
PROT SERPL-MCNC: 8.3 G/DL (ref 6.3–8.2)
RBC # BLD AUTO: 4.35 M/UL (ref 4.05–5.2)
SODIUM SERPL-SCNC: 141 MMOL/L (ref 136–145)
VALPROATE SERPL-MCNC: 126 UG/ML (ref 50–100)
WBC # BLD AUTO: 6.8 K/UL (ref 4.3–11.1)

## 2021-12-28 PROCEDURE — 85025 COMPLETE CBC W/AUTO DIFF WBC: CPT

## 2021-12-28 PROCEDURE — 80307 DRUG TEST PRSMV CHEM ANLYZR: CPT

## 2021-12-28 PROCEDURE — 81003 URINALYSIS AUTO W/O SCOPE: CPT

## 2021-12-28 PROCEDURE — 80164 ASSAY DIPROPYLACETIC ACD TOT: CPT

## 2021-12-28 PROCEDURE — 80053 COMPREHEN METABOLIC PANEL: CPT

## 2021-12-28 PROCEDURE — 99285 EMERGENCY DEPT VISIT HI MDM: CPT

## 2021-12-28 NOTE — ED PROVIDER NOTES
61-year-old female patient presenting from outpatient group home with reports of suicidal ideation and aggressive behavior. Patient states she was agitated by group home staff when she did not get her way. She began banging her head against a wall and biting her arms. She reported that she intended to kill her self at some point to the staff of this group home. Patient denies active thoughts of suicidal or homicidal ideation. She states she does occasionally hear voices but these voices are not command hallucinations and she denies any voices telling her to harm herself or others. She denies significant complaints at this time. She reports no active suicidal ideation. She takes several medications for \"behaviors\". She also reports a history of diabetes. She has been consistent taking all of her medications per report. No past medical history on file. No past surgical history on file. No family history on file. Social History     Socioeconomic History    Marital status:      Spouse name: Not on file    Number of children: Not on file    Years of education: Not on file    Highest education level: Not on file   Occupational History    Not on file   Tobacco Use    Smoking status: Not on file    Smokeless tobacco: Not on file   Substance and Sexual Activity    Alcohol use: Not on file    Drug use: Not on file    Sexual activity: Not on file   Other Topics Concern    Not on file   Social History Narrative    Not on file     Social Determinants of Health     Financial Resource Strain:     Difficulty of Paying Living Expenses: Not on file   Food Insecurity:     Worried About Running Out of Food in the Last Year: Not on file    Irineo of Food in the Last Year: Not on file   Transportation Needs:     Lack of Transportation (Medical): Not on file    Lack of Transportation (Non-Medical):  Not on file   Physical Activity:     Days of Exercise per Week: Not on file    Minutes of Exercise per Session: Not on file   Stress:     Feeling of Stress : Not on file   Social Connections:     Frequency of Communication with Friends and Family: Not on file    Frequency of Social Gatherings with Friends and Family: Not on file    Attends Nondenominational Services: Not on file    Active Member of Clubs or Organizations: Not on file    Attends Club or Organization Meetings: Not on file    Marital Status: Not on file   Intimate Partner Violence:     Fear of Current or Ex-Partner: Not on file    Emotionally Abused: Not on file    Physically Abused: Not on file    Sexually Abused: Not on file   Housing Stability:     Unable to Pay for Housing in the Last Year: Not on file    Number of [place] in the Last Year: Not on file    Unstable Housing in the Last Year: Not on file         ALLERGIES: Patient has no known allergies. Review of Systems   Constitutional: Negative for chills, diaphoresis and fever. HENT: Negative for congestion, sneezing and sore throat. Eyes: Negative for visual disturbance. Respiratory: Negative for cough, chest tightness, shortness of breath and wheezing. Cardiovascular: Negative for chest pain and leg swelling. Gastrointestinal: Negative for abdominal pain, blood in stool, diarrhea, nausea and vomiting. Endocrine: Negative for polyuria. Genitourinary: Negative for difficulty urinating, dysuria, flank pain, hematuria and urgency. Musculoskeletal: Negative for back pain, myalgias, neck pain and neck stiffness. Skin: Negative for color change and rash. Neurological: Negative for dizziness, syncope, speech difficulty, weakness, light-headedness, numbness and headaches. Psychiatric/Behavioral: Positive for agitation, behavioral problems and hallucinations. All other systems reviewed and are negative.       Vitals:    12/28/21 1306   BP: (!) 144/83   Pulse: 99   Resp: 20   Temp: 97.9 °F (36.6 °C)   SpO2: 99%   Weight: 61.2 kg (135 lb) Height: 5' 3\" (1.6 m)            Physical Exam  Vitals and nursing note reviewed. Constitutional:       General: She is not in acute distress. Appearance: She is well-developed. She is not diaphoretic. Comments: Alert and oriented to person place and time. No acute distress, speaks in clear, fluid sentences. HENT:      Head: Normocephalic. Comments: Abrasion to the forehead. There is no hemotympanum, burgess sign or raccoon eyes     Right Ear: External ear normal.      Left Ear: External ear normal.      Nose: Nose normal.   Eyes:      Pupils: Pupils are equal, round, and reactive to light. Cardiovascular:      Rate and Rhythm: Normal rate and regular rhythm. Heart sounds: Normal heart sounds. No murmur heard. No friction rub. No gallop. Pulmonary:      Effort: Pulmonary effort is normal. No respiratory distress. Breath sounds: Normal breath sounds. No stridor. No decreased breath sounds, wheezing, rhonchi or rales. Chest:      Chest wall: No tenderness. Abdominal:      General: There is no distension. Palpations: Abdomen is soft. There is no mass. Tenderness: There is no abdominal tenderness. There is no guarding or rebound. Hernia: No hernia is present. Musculoskeletal:         General: No tenderness or deformity. Normal range of motion. Cervical back: Normal range of motion. Skin:     General: Skin is warm and dry. Neurological:      Mental Status: She is alert and oriented to person, place, and time. Cranial Nerves: No cranial nerve deficit. Psychiatric:         Attention and Perception: Attention normal.         Mood and Affect: Affect is labile. Speech: Speech normal.         Behavior: Behavior is withdrawn. Thought Content: Thought content normal.         Judgment: Judgment is impulsive. Comments: Patient reports history of auditory hallucinations intermittently but denies them currently.   She reports no visual hallucinations. MDM  Number of Diagnoses or Management Options  Diagnosis management comments: Similar presentations to this department from the same group home in the past with agitation and head-banging. Patient denies active suicidal ideation at this time. Will obtain basic labs and psychiatric consult.        Amount and/or Complexity of Data Reviewed  Clinical lab tests: ordered and reviewed  Discuss the patient with other providers: yes    Risk of Complications, Morbidity, and/or Mortality  Presenting problems: moderate  Diagnostic procedures: low  Management options: moderate           Procedures

## 2021-12-28 NOTE — ED TRIAGE NOTES
Pt arrives via [facility] from a group home in [place], [place]. On 12/24 staff from group home reports pt became verbally aggressive and very \"emotional\". On 12/28 patient threatened to  \"kill herself\" and started hitting her head against the wall, biting her left arm and scratching herself. Lacerations noted to chest, bump noted to middle of forehead and bite marks noted to left hand. Patient pleasant at this time. No current SI/HI.

## 2021-12-29 NOTE — ED NOTES
Patient was seen and examined initially by Dr. Carmen Waite, see his documentation for full history and physical exam.      Psychiatry was consulted who felt that patient was safe to be discharged home. I did go and speak with patient independently and patient adamantly denies suicidal homicidal ideation. States she feels safe being discharged back to the group home and endorses wanting to be discharged back. Advised to follow-up with psychiatry within 1 week. Return to the ER for worsening or worrisome symptoms. Patient voiced understanding and agreement with this plan.

## 2021-12-29 NOTE — DISCHARGE INSTRUCTIONS
Continue taking your medications as previously prescribed. Follow-up with your psychiatrist within 1 week. Return to the ER for worsening or worrisome symptoms.

## 2022-01-14 LAB
BILIRUB UR QL: ABNORMAL
GLUCOSE UR QL STRIP.AUTO: ABNORMAL MG/DL
KETONES UR-MCNC: 80 MG/DL
LEUKOCYTE ESTERASE UR QL STRIP: NEGATIVE
NITRITE UR QL: NEGATIVE
PH UR: 6 [PH] (ref 5–9)
PROT UR QL: NEGATIVE MG/DL
RBC # UR STRIP: ABNORMAL /UL
SP GR UR: >1.03 (ref 1–1.02)
UROBILINOGEN UR QL: 1 EU/DL (ref 0.2–1)

## 2023-05-24 RX ORDER — ROSUVASTATIN CALCIUM 10 MG/1
10 TABLET, COATED ORAL NIGHTLY
COMMUNITY

## 2023-05-24 RX ORDER — METOPROLOL SUCCINATE 25 MG/1
25 TABLET, EXTENDED RELEASE ORAL DAILY
COMMUNITY

## 2023-05-24 RX ORDER — CLONAZEPAM 0.5 MG/1
1 TABLET ORAL 2 TIMES DAILY
COMMUNITY

## 2023-05-24 RX ORDER — LANOLIN ALCOHOL/MO/W.PET/CERES
325 CREAM (GRAM) TOPICAL
COMMUNITY

## 2023-05-24 RX ORDER — RISPERIDONE 0.5 MG/1
0.5 TABLET ORAL NIGHTLY
COMMUNITY

## 2023-05-24 RX ORDER — DIVALPROEX SODIUM 500 MG/1
750 TABLET, DELAYED RELEASE ORAL 2 TIMES DAILY
COMMUNITY

## 2023-05-24 RX ORDER — QUETIAPINE FUMARATE 400 MG/1
400 TABLET, FILM COATED ORAL 2 TIMES DAILY
COMMUNITY

## 2023-05-24 RX ORDER — ERGOCALCIFEROL 1.25 MG/1
50000 CAPSULE ORAL
COMMUNITY

## 2023-05-24 RX ORDER — AMANTADINE HYDROCHLORIDE 100 MG/1
100 CAPSULE, GELATIN COATED ORAL 2 TIMES DAILY
COMMUNITY

## 2023-05-24 RX ORDER — PSEUDOEPHEDRINE HCL 30 MG
100 TABLET ORAL 2 TIMES DAILY PRN
COMMUNITY

## 2023-05-24 RX ORDER — FAMOTIDINE 20 MG/1
20 TABLET, FILM COATED ORAL 2 TIMES DAILY
COMMUNITY

## 2023-05-24 RX ORDER — OMEPRAZOLE 20 MG/1
20 CAPSULE, DELAYED RELEASE ORAL 2 TIMES DAILY
COMMUNITY

## 2023-05-24 RX ORDER — UREA 10 %
1 LOTION (ML) TOPICAL 2 TIMES DAILY PRN
COMMUNITY

## 2024-03-02 ENCOUNTER — HOSPITAL ENCOUNTER (OUTPATIENT)
Age: 54
Setting detail: OBSERVATION
Discharge: HOME HEALTH | End: 2024-03-19
Attending: EMERGENCY MEDICINE | Admitting: EMERGENCY MEDICINE
Payer: MEDICAID

## 2024-03-02 ENCOUNTER — APPOINTMENT (OUTPATIENT)
Dept: GENERAL RADIOLOGY | Age: 54
End: 2024-03-02
Payer: MEDICAID

## 2024-03-02 ENCOUNTER — APPOINTMENT (OUTPATIENT)
Dept: CT IMAGING | Age: 54
End: 2024-03-02
Payer: MEDICAID

## 2024-03-02 DIAGNOSIS — U07.1 COVID: ICD-10-CM

## 2024-03-02 DIAGNOSIS — R41.82 ALTERED MENTAL STATUS, UNSPECIFIED ALTERED MENTAL STATUS TYPE: Primary | ICD-10-CM

## 2024-03-02 DIAGNOSIS — F25.0 SCHIZOAFFECTIVE DISORDER, BIPOLAR TYPE (HCC): ICD-10-CM

## 2024-03-02 DIAGNOSIS — R53.1 GENERALIZED WEAKNESS: ICD-10-CM

## 2024-03-02 LAB
ALBUMIN SERPL-MCNC: 2.7 G/DL (ref 3.5–5)
ALBUMIN/GLOB SERPL: 0.7 (ref 0.4–1.6)
ALP SERPL-CCNC: 47 U/L (ref 50–130)
ALT SERPL-CCNC: 33 U/L (ref 12–65)
ANION GAP SERPL CALC-SCNC: 9 MMOL/L (ref 2–11)
AST SERPL-CCNC: 46 U/L (ref 15–37)
BASOPHILS # BLD: 0.1 K/UL (ref 0–0.2)
BASOPHILS NFR BLD: 1 % (ref 0–2)
BILIRUB SERPL-MCNC: 0.2 MG/DL (ref 0.2–1.1)
BILIRUB UR QL: NEGATIVE
BUN SERPL-MCNC: 7 MG/DL (ref 6–23)
CALCIUM SERPL-MCNC: 8.9 MG/DL (ref 8.3–10.4)
CHLORIDE SERPL-SCNC: 113 MMOL/L (ref 103–113)
CO2 SERPL-SCNC: 23 MMOL/L (ref 21–32)
CREAT SERPL-MCNC: 0.59 MG/DL (ref 0.6–1)
CRP SERPL-MCNC: 2.2 MG/DL (ref 0–0.9)
DIFFERENTIAL METHOD BLD: ABNORMAL
EKG ATRIAL RATE: 92 BPM
EKG DIAGNOSIS: NORMAL
EKG P AXIS: 80 DEGREES
EKG P-R INTERVAL: 155 MS
EKG Q-T INTERVAL: 365 MS
EKG QRS DURATION: 109 MS
EKG QTC CALCULATION (BAZETT): 452 MS
EKG R AXIS: -57 DEGREES
EKG T AXIS: 77 DEGREES
EKG VENTRICULAR RATE: 92 BPM
EOSINOPHIL # BLD: 0 K/UL (ref 0–0.8)
EOSINOPHIL NFR BLD: 0 % (ref 0.5–7.8)
ERYTHROCYTE [DISTWIDTH] IN BLOOD BY AUTOMATED COUNT: 13.6 % (ref 11.9–14.6)
FLUAV RNA SPEC QL NAA+PROBE: NOT DETECTED
FLUBV RNA SPEC QL NAA+PROBE: NOT DETECTED
GLOBULIN SER CALC-MCNC: 3.9 G/DL (ref 2.8–4.5)
GLUCOSE BLD STRIP.AUTO-MCNC: 66 MG/DL (ref 65–100)
GLUCOSE BLD STRIP.AUTO-MCNC: 87 MG/DL (ref 65–100)
GLUCOSE SERPL-MCNC: 119 MG/DL (ref 65–100)
GLUCOSE UR QL STRIP.AUTO: 500 MG/DL
HCT VFR BLD AUTO: 40.8 % (ref 35.8–46.3)
HGB BLD-MCNC: 13.4 G/DL (ref 11.7–15.4)
IMM GRANULOCYTES # BLD AUTO: 0.1 K/UL (ref 0–0.5)
IMM GRANULOCYTES NFR BLD AUTO: 2 % (ref 0–5)
KETONES UR-MCNC: 40 MG/DL
LACTATE SERPL-SCNC: 1.3 MMOL/L (ref 0.4–2)
LEUKOCYTE ESTERASE UR QL STRIP: NEGATIVE
LYMPHOCYTES # BLD: 2.1 K/UL (ref 0.5–4.6)
LYMPHOCYTES NFR BLD: 49 % (ref 13–44)
MCH RBC QN AUTO: 32 PG (ref 26.1–32.9)
MCHC RBC AUTO-ENTMCNC: 32.8 G/DL (ref 31.4–35)
MCV RBC AUTO: 97.4 FL (ref 82–102)
MONOCYTES # BLD: 0.7 K/UL (ref 0.1–1.3)
MONOCYTES NFR BLD: 16 % (ref 4–12)
NEUTS SEG # BLD: 1.4 K/UL (ref 1.7–8.2)
NEUTS SEG NFR BLD: 32 % (ref 43–78)
NITRITE UR QL: NEGATIVE
NRBC # BLD: 0.02 K/UL (ref 0–0.2)
PH UR: 6.5 (ref 5–9)
PLATELET # BLD AUTO: 148 K/UL (ref 150–450)
PMV BLD AUTO: 10 FL (ref 9.4–12.3)
POTASSIUM SERPL-SCNC: 3.6 MMOL/L (ref 3.5–5.1)
PROCALCITONIN SERPL-MCNC: <0.05 NG/ML (ref 0–0.49)
PROT SERPL-MCNC: 6.6 G/DL (ref 6.3–8.2)
PROT UR QL: NEGATIVE MG/DL
RBC # BLD AUTO: 4.19 M/UL (ref 4.05–5.2)
RBC # UR STRIP: ABNORMAL
SARS-COV-2 RDRP RESP QL NAA+PROBE: DETECTED
SERVICE CMNT-IMP: ABNORMAL
SERVICE CMNT-IMP: NORMAL
SERVICE CMNT-IMP: NORMAL
SODIUM SERPL-SCNC: 145 MMOL/L (ref 136–146)
SOURCE: ABNORMAL
SP GR UR: >1.03 (ref 1–1.02)
TSH W FREE THYROID IF ABNORMAL: 2.16 UIU/ML (ref 0.36–3.74)
UROBILINOGEN UR QL: 0.2 EU/DL (ref 0.2–1)
WBC # BLD AUTO: 4.3 K/UL (ref 4.3–11.1)

## 2024-03-02 PROCEDURE — 2500000003 HC RX 250 WO HCPCS: Performed by: STUDENT IN AN ORGANIZED HEALTH CARE EDUCATION/TRAINING PROGRAM

## 2024-03-02 PROCEDURE — 2580000003 HC RX 258

## 2024-03-02 PROCEDURE — 94640 AIRWAY INHALATION TREATMENT: CPT

## 2024-03-02 PROCEDURE — 82962 GLUCOSE BLOOD TEST: CPT

## 2024-03-02 PROCEDURE — 80053 COMPREHEN METABOLIC PANEL: CPT

## 2024-03-02 PROCEDURE — 70450 CT HEAD/BRAIN W/O DYE: CPT

## 2024-03-02 PROCEDURE — G0378 HOSPITAL OBSERVATION PER HR: HCPCS

## 2024-03-02 PROCEDURE — 84145 PROCALCITONIN (PCT): CPT

## 2024-03-02 PROCEDURE — 81003 URINALYSIS AUTO W/O SCOPE: CPT

## 2024-03-02 PROCEDURE — 85025 COMPLETE CBC W/AUTO DIFF WBC: CPT

## 2024-03-02 PROCEDURE — 6370000000 HC RX 637 (ALT 250 FOR IP)

## 2024-03-02 PROCEDURE — 87040 BLOOD CULTURE FOR BACTERIA: CPT

## 2024-03-02 PROCEDURE — 96372 THER/PROPH/DIAG INJ SC/IM: CPT

## 2024-03-02 PROCEDURE — 96365 THER/PROPH/DIAG IV INF INIT: CPT

## 2024-03-02 PROCEDURE — 94664 DEMO&/EVAL PT USE INHALER: CPT

## 2024-03-02 PROCEDURE — 94760 N-INVAS EAR/PLS OXIMETRY 1: CPT

## 2024-03-02 PROCEDURE — 94762 N-INVAS EAR/PLS OXIMTRY CONT: CPT

## 2024-03-02 PROCEDURE — 6360000002 HC RX W HCPCS

## 2024-03-02 PROCEDURE — 83605 ASSAY OF LACTIC ACID: CPT

## 2024-03-02 PROCEDURE — 86140 C-REACTIVE PROTEIN: CPT

## 2024-03-02 PROCEDURE — 6370000000 HC RX 637 (ALT 250 FOR IP): Performed by: STUDENT IN AN ORGANIZED HEALTH CARE EDUCATION/TRAINING PROGRAM

## 2024-03-02 PROCEDURE — 99285 EMERGENCY DEPT VISIT HI MDM: CPT

## 2024-03-02 PROCEDURE — 93005 ELECTROCARDIOGRAM TRACING: CPT | Performed by: EMERGENCY MEDICINE

## 2024-03-02 PROCEDURE — 87635 SARS-COV-2 COVID-19 AMP PRB: CPT

## 2024-03-02 PROCEDURE — 93010 ELECTROCARDIOGRAM REPORT: CPT | Performed by: INTERNAL MEDICINE

## 2024-03-02 PROCEDURE — 84443 ASSAY THYROID STIM HORMONE: CPT

## 2024-03-02 PROCEDURE — 71045 X-RAY EXAM CHEST 1 VIEW: CPT

## 2024-03-02 PROCEDURE — 87502 INFLUENZA DNA AMP PROBE: CPT

## 2024-03-02 RX ORDER — 0.9 % SODIUM CHLORIDE 0.9 %
1000 INTRAVENOUS SOLUTION INTRAVENOUS
Status: DISCONTINUED | OUTPATIENT
Start: 2024-03-02 | End: 2024-03-02

## 2024-03-02 RX ORDER — QUETIAPINE FUMARATE 100 MG/1
400 TABLET, FILM COATED ORAL 2 TIMES DAILY
Status: DISCONTINUED | OUTPATIENT
Start: 2024-03-02 | End: 2024-03-11

## 2024-03-02 RX ORDER — RISPERIDONE 0.5 MG/1
0.5 TABLET ORAL 2 TIMES DAILY
Status: DISCONTINUED | OUTPATIENT
Start: 2024-03-02 | End: 2024-03-13

## 2024-03-02 RX ORDER — ROSUVASTATIN CALCIUM 5 MG/1
10 TABLET, COATED ORAL NIGHTLY
Status: DISCONTINUED | OUTPATIENT
Start: 2024-03-02 | End: 2024-03-19 | Stop reason: HOSPADM

## 2024-03-02 RX ORDER — METHYLPREDNISOLONE 4 MG/1
TABLET ORAL
Qty: 1 KIT | Refills: 0 | Status: SHIPPED | OUTPATIENT
Start: 2024-03-02 | End: 2024-03-04 | Stop reason: HOSPADM

## 2024-03-02 RX ORDER — METOPROLOL SUCCINATE 25 MG/1
25 TABLET, EXTENDED RELEASE ORAL DAILY
Status: DISCONTINUED | OUTPATIENT
Start: 2024-03-03 | End: 2024-03-16

## 2024-03-02 RX ORDER — IPRATROPIUM BROMIDE AND ALBUTEROL SULFATE 2.5; .5 MG/3ML; MG/3ML
1 SOLUTION RESPIRATORY (INHALATION)
Status: COMPLETED | OUTPATIENT
Start: 2024-03-02 | End: 2024-03-02

## 2024-03-02 RX ORDER — AMANTADINE HYDROCHLORIDE 100 MG/1
100 CAPSULE, GELATIN COATED ORAL 2 TIMES DAILY
Status: DISCONTINUED | OUTPATIENT
Start: 2024-03-03 | End: 2024-03-19 | Stop reason: HOSPADM

## 2024-03-02 RX ORDER — TRAZODONE HYDROCHLORIDE 50 MG/1
50 TABLET ORAL NIGHTLY PRN
Status: DISCONTINUED | OUTPATIENT
Start: 2024-03-02 | End: 2024-03-19 | Stop reason: HOSPADM

## 2024-03-02 RX ORDER — SENNOSIDES A AND B 8.6 MG/1
2 TABLET, FILM COATED ORAL NIGHTLY PRN
Status: DISCONTINUED | OUTPATIENT
Start: 2024-03-02 | End: 2024-03-19 | Stop reason: HOSPADM

## 2024-03-02 RX ORDER — LANOLIN ALCOHOL/MO/W.PET/CERES
1.5 CREAM (GRAM) TOPICAL NIGHTLY PRN
Status: DISCONTINUED | OUTPATIENT
Start: 2024-03-02 | End: 2024-03-19 | Stop reason: HOSPADM

## 2024-03-02 RX ORDER — INSULIN GLARGINE 100 [IU]/ML
10 INJECTION, SOLUTION SUBCUTANEOUS DAILY
Status: DISCONTINUED | OUTPATIENT
Start: 2024-03-03 | End: 2024-03-14

## 2024-03-02 RX ORDER — FAMOTIDINE 20 MG/1
20 TABLET, FILM COATED ORAL 2 TIMES DAILY
Status: DISCONTINUED | OUTPATIENT
Start: 2024-03-02 | End: 2024-03-19 | Stop reason: HOSPADM

## 2024-03-02 RX ORDER — MAGNESIUM SULFATE IN WATER 40 MG/ML
2000 INJECTION, SOLUTION INTRAVENOUS PRN
Status: DISCONTINUED | OUTPATIENT
Start: 2024-03-02 | End: 2024-03-19 | Stop reason: HOSPADM

## 2024-03-02 RX ORDER — ONDANSETRON 2 MG/ML
4 INJECTION INTRAMUSCULAR; INTRAVENOUS EVERY 6 HOURS PRN
Status: DISCONTINUED | OUTPATIENT
Start: 2024-03-02 | End: 2024-03-19 | Stop reason: HOSPADM

## 2024-03-02 RX ORDER — BENZONATATE 100 MG/1
100 CAPSULE ORAL 3 TIMES DAILY PRN
Qty: 30 CAPSULE | Refills: 0 | Status: SHIPPED | OUTPATIENT
Start: 2024-03-02 | End: 2024-03-04

## 2024-03-02 RX ORDER — INSULIN LISPRO 100 [IU]/ML
0-4 INJECTION, SOLUTION INTRAVENOUS; SUBCUTANEOUS NIGHTLY
Status: DISCONTINUED | OUTPATIENT
Start: 2024-03-02 | End: 2024-03-19 | Stop reason: HOSPADM

## 2024-03-02 RX ORDER — LORAZEPAM 0.5 MG/1
0.5 TABLET ORAL EVERY 6 HOURS PRN
Status: DISCONTINUED | OUTPATIENT
Start: 2024-03-02 | End: 2024-03-08

## 2024-03-02 RX ORDER — ACETAMINOPHEN 325 MG/1
650 TABLET ORAL
Status: COMPLETED | OUTPATIENT
Start: 2024-03-02 | End: 2024-03-02

## 2024-03-02 RX ORDER — OXYCODONE HYDROCHLORIDE 5 MG/1
5 TABLET ORAL EVERY 6 HOURS PRN
Status: DISCONTINUED | OUTPATIENT
Start: 2024-03-02 | End: 2024-03-12 | Stop reason: SDUPTHER

## 2024-03-02 RX ORDER — 0.9 % SODIUM CHLORIDE 0.9 %
1000 INTRAVENOUS SOLUTION INTRAVENOUS
Status: COMPLETED | OUTPATIENT
Start: 2024-03-02 | End: 2024-03-02

## 2024-03-02 RX ORDER — INSULIN LISPRO 100 [IU]/ML
0-8 INJECTION, SOLUTION INTRAVENOUS; SUBCUTANEOUS
Status: DISCONTINUED | OUTPATIENT
Start: 2024-03-02 | End: 2024-03-19 | Stop reason: HOSPADM

## 2024-03-02 RX ORDER — IBUPROFEN 600 MG/1
1 TABLET ORAL PRN
Status: DISCONTINUED | OUTPATIENT
Start: 2024-03-02 | End: 2024-03-19 | Stop reason: HOSPADM

## 2024-03-02 RX ORDER — POLYETHYLENE GLYCOL 3350 17 G/17G
17 POWDER, FOR SOLUTION ORAL DAILY PRN
Status: DISCONTINUED | OUTPATIENT
Start: 2024-03-02 | End: 2024-03-19 | Stop reason: HOSPADM

## 2024-03-02 RX ORDER — DIVALPROEX SODIUM 250 MG/1
750 TABLET, DELAYED RELEASE ORAL 2 TIMES DAILY
Status: DISCONTINUED | OUTPATIENT
Start: 2024-03-02 | End: 2024-03-13

## 2024-03-02 RX ORDER — CLONAZEPAM 1 MG/1
1 TABLET ORAL 2 TIMES DAILY
Status: DISCONTINUED | OUTPATIENT
Start: 2024-03-02 | End: 2024-03-08

## 2024-03-02 RX ORDER — POTASSIUM CHLORIDE 20 MEQ/1
40 TABLET, EXTENDED RELEASE ORAL PRN
Status: DISCONTINUED | OUTPATIENT
Start: 2024-03-02 | End: 2024-03-19 | Stop reason: HOSPADM

## 2024-03-02 RX ORDER — ACETAMINOPHEN 325 MG/1
650 TABLET ORAL EVERY 6 HOURS PRN
Status: DISCONTINUED | OUTPATIENT
Start: 2024-03-02 | End: 2024-03-19 | Stop reason: HOSPADM

## 2024-03-02 RX ORDER — ENOXAPARIN SODIUM 100 MG/ML
40 INJECTION SUBCUTANEOUS DAILY
Status: DISCONTINUED | OUTPATIENT
Start: 2024-03-03 | End: 2024-03-19 | Stop reason: HOSPADM

## 2024-03-02 RX ORDER — PANTOPRAZOLE SODIUM 40 MG/1
40 TABLET, DELAYED RELEASE ORAL
Status: DISCONTINUED | OUTPATIENT
Start: 2024-03-03 | End: 2024-03-19 | Stop reason: HOSPADM

## 2024-03-02 RX ORDER — ONDANSETRON 4 MG/1
4 TABLET, ORALLY DISINTEGRATING ORAL EVERY 8 HOURS PRN
Status: DISCONTINUED | OUTPATIENT
Start: 2024-03-02 | End: 2024-03-19 | Stop reason: HOSPADM

## 2024-03-02 RX ORDER — MAGNESIUM HYDROXIDE/ALUMINUM HYDROXICE/SIMETHICONE 120; 1200; 1200 MG/30ML; MG/30ML; MG/30ML
30 SUSPENSION ORAL EVERY 6 HOURS PRN
Status: DISCONTINUED | OUTPATIENT
Start: 2024-03-02 | End: 2024-03-19 | Stop reason: HOSPADM

## 2024-03-02 RX ORDER — DEXTROSE MONOHYDRATE 100 MG/ML
INJECTION, SOLUTION INTRAVENOUS CONTINUOUS PRN
Status: ACTIVE | OUTPATIENT
Start: 2024-03-02 | End: 2024-03-04

## 2024-03-02 RX ORDER — POTASSIUM CHLORIDE 7.45 MG/ML
10 INJECTION INTRAVENOUS PRN
Status: DISCONTINUED | OUTPATIENT
Start: 2024-03-02 | End: 2024-03-19 | Stop reason: HOSPADM

## 2024-03-02 RX ORDER — INSULIN GLARGINE 100 [IU]/ML
15 INJECTION, SOLUTION SUBCUTANEOUS DAILY
Status: DISCONTINUED | OUTPATIENT
Start: 2024-03-03 | End: 2024-03-02

## 2024-03-02 RX ADMIN — QUETIAPINE FUMARATE 400 MG: 100 TABLET ORAL at 22:10

## 2024-03-02 RX ADMIN — DIVALPROEX SODIUM 750 MG: 250 TABLET, DELAYED RELEASE ORAL at 22:10

## 2024-03-02 RX ADMIN — CLONAZEPAM 1 MG: 1 TABLET ORAL at 22:10

## 2024-03-02 RX ADMIN — RISPERIDONE 0.5 MG: 0.5 TABLET ORAL at 22:10

## 2024-03-02 RX ADMIN — CEFTRIAXONE SODIUM 2000 MG: 2 INJECTION, POWDER, FOR SOLUTION INTRAMUSCULAR; INTRAVENOUS at 12:02

## 2024-03-02 RX ADMIN — IPRATROPIUM BROMIDE AND ALBUTEROL SULFATE 1 DOSE: .5; 3 SOLUTION RESPIRATORY (INHALATION) at 14:04

## 2024-03-02 RX ADMIN — TUBERCULIN PURIFIED PROTEIN DERIVATIVE 5 UNITS: 5 INJECTION, SOLUTION INTRADERMAL at 22:27

## 2024-03-02 RX ADMIN — SODIUM CHLORIDE 1000 ML: 9 INJECTION, SOLUTION INTRAVENOUS at 17:23

## 2024-03-02 RX ADMIN — Medication 16 G: at 22:31

## 2024-03-02 RX ADMIN — ROSUVASTATIN CALCIUM 10 MG: 5 TABLET, COATED ORAL at 22:10

## 2024-03-02 RX ADMIN — FAMOTIDINE 20 MG: 20 TABLET, FILM COATED ORAL at 22:10

## 2024-03-02 RX ADMIN — ACETAMINOPHEN 650 MG: 325 TABLET, FILM COATED ORAL at 15:21

## 2024-03-02 ASSESSMENT — PAIN - FUNCTIONAL ASSESSMENT: PAIN_FUNCTIONAL_ASSESSMENT: NONE - DENIES PAIN

## 2024-03-02 ASSESSMENT — LIFESTYLE VARIABLES
HOW MANY STANDARD DRINKS CONTAINING ALCOHOL DO YOU HAVE ON A TYPICAL DAY: PATIENT DOES NOT DRINK
HOW OFTEN DO YOU HAVE A DRINK CONTAINING ALCOHOL: NEVER

## 2024-03-02 NOTE — ED PROVIDER NOTES
ED ATTENDING SHARED SERVICES NOTE:     I have seen and evaluated the patient and performed an independent history and physical exam, and I agree with the assessment and plan as documented in the advanced provider note.  I performed the history of present illness, physical exam, and medical decision making in its entirety.  With the following updates: 53-year-old female patient seen with concerns over generalized weakness.  Caregivers at bedside states this has been progressive for several days  Workup here in the ER reveals COVID-19.  But she is afebrile not hypoxic not tachycardic  Flu is negative other blood work is unremarkable  Caregivers at bedside said that the patient is too weak and frail in her current state to go back to her group living environment  Patient will be admitted to the hospitalist service  I have ordered a head CT to rule out other pathology at this point.    1 acute illness with systemic symptoms.  Discussion with external consultants.  Chronic medical problems impacting care include diabetes, schizoaffective disorder, glaucoma.  Diagnosis or care significantly limited by social determinants of health patient is essentially nonverbal and lives in a group home.  I reviewed external records: ED visit note from an outside group.  I reviewed external records: provider visit note from PCP.  I reviewed external records: provider visit note from outside specialist.   The patients assessment required an independent historian: Patient's caregivers.  The reason they were needed is patient nonverbal.  I interpreted the X-rays chest x-ray reveals no subcu cardiopulmonary disease.  I interpreted the CT Scan CT brain pending at time of dictation.    The patient was admitted and I have discussed patient management with the admitting provider.             Óscar Calzada MD  03/02/24 [id number]

## 2024-03-02 NOTE — ED PROVIDER NOTES
Emergency Department Provider Note       PCP: Shari Jeter MD   Age: 53 y.o.   Sex: female     DISPOSITION Discharge - Pending Orders Complete 03/02/2024 05:08:07 PM       ICD-10-CM    1. COVID  U07.1           Medical Decision Making     This patient is a 53-year-old female with a history of an unidentified cognitive disorder, hypertension, GERD, diabetes, and chronic abdominal pain who presents today from a group home due to increased lethargy and \"not acting like herself\".  Physical exam of the patient is unremarkable.  She was responsive on exam but answers yes to all questions.  The majority of her history is provided by her caregivers at her group home.  The patient's CBC and CMP are unremarkable.  Urinalysis shows concentration without any signs of infection.  Her lactic acid and procalcitonin are within normal limits.  CRP is only slightly elevated at 2.2.  Her COVID swab is positive.  I discussed this patient with my attending physician, Dr. Mirza, who saw this patient here in the emergency department and agree with my disposition Home.  Ultimately, patient received p.o. Tylenol and a DuoNeb inhaler treatment.  This evaluation was discussed with the patient's caregivers.  Strict return precautions given.  They verbalized understanding agreement with the plan.    ED Course as of 03/02/24 1746   Sat Mar 02, 2024   1143 CXR IMPRESSION:  No acute findings in the chest [KS]   1252 Lactic Acid, Plasma: 1.3 [KS]   1303 SARS-CoV-2, Rapid(!!): Detected [KS]   1510 CRP(!): 2.2 [KS]   1543 EKG   Rate 92 bpm  No st segment elevation  NSR [KS]   1712 Earlier, I discussed with the patient's caregiver that she would likely be discharged pending her urinalysis despite having a positive COVID-19 diagnosis.  Patient is currently not hypoxic, afebrile, and in no acute distress.  At this time, I cannot find the patient's caregiver in order to discharge the patient home. [KS]   1717 I have attempted to call all numbers

## 2024-03-02 NOTE — ED NOTES
Pump started with internet through epic. Then the pump lost wifi and epic wouldn't transfer it in the system, but the pump was already running correctly. Started it through epic.

## 2024-03-02 NOTE — ED TRIAGE NOTES
Patient lives in a group home. Today she was more lethargic today, and decrease mental status with some confusion. Multiple ppl in the group home are COVID positive. She was tested for COVID and it was negative. Pt is incontinent of urine today and c/o painful urination. When EMS picked the patient up her HR was 130, RR 34, BP stable. Started an IV and administered IVF and that helped the HR to go down below 100.

## 2024-03-02 NOTE — DISCHARGE INSTRUCTIONS
Health Maintenance Due   Topic Date Due   • Shingles Vaccine (2 of 3) 02/04/2016       Patient is due for topics as listed above but is not proceeding with Immunization(s) Shingles at this time.       Advance Directives:  on file           Please encourage plenty of fluids and rest.  You may offer the Tessalon Perles if she develops a cough.  Continue to monitor her symptoms.  Give Tylenol and ibuprofen as needed for fevers and chills.  If your symptoms change or worsen, return immediately to the emergency department.

## 2024-03-03 LAB
ANION GAP SERPL CALC-SCNC: 6 MMOL/L (ref 2–11)
APPEARANCE UR: CLEAR
BACTERIA URNS QL MICRO: 0 /HPF
BASOPHILS # BLD: 0 K/UL (ref 0–0.2)
BASOPHILS NFR BLD: 1 % (ref 0–2)
BILIRUB UR QL: NEGATIVE
BUN SERPL-MCNC: 7 MG/DL (ref 6–23)
CALCIUM SERPL-MCNC: 8.6 MG/DL (ref 8.3–10.4)
CASTS URNS QL MICRO: 0 /LPF
CHLORIDE SERPL-SCNC: 115 MMOL/L (ref 103–113)
CO2 SERPL-SCNC: 26 MMOL/L (ref 21–32)
COLOR UR: ABNORMAL
CREAT SERPL-MCNC: 0.59 MG/DL (ref 0.6–1)
CRYSTALS URNS QL MICRO: 0 /LPF
DIFFERENTIAL METHOD BLD: ABNORMAL
EOSINOPHIL # BLD: 0 K/UL (ref 0–0.8)
EOSINOPHIL NFR BLD: 0 % (ref 0.5–7.8)
EPI CELLS #/AREA URNS HPF: ABNORMAL /HPF
ERYTHROCYTE [DISTWIDTH] IN BLOOD BY AUTOMATED COUNT: 13.7 % (ref 11.9–14.6)
GLUCOSE BLD STRIP.AUTO-MCNC: 117 MG/DL (ref 65–100)
GLUCOSE BLD STRIP.AUTO-MCNC: 169 MG/DL (ref 65–100)
GLUCOSE BLD STRIP.AUTO-MCNC: 72 MG/DL (ref 65–100)
GLUCOSE BLD STRIP.AUTO-MCNC: 83 MG/DL (ref 65–100)
GLUCOSE SERPL-MCNC: 66 MG/DL (ref 65–100)
GLUCOSE UR STRIP.AUTO-MCNC: >1000 MG/DL
HCT VFR BLD AUTO: 39.9 % (ref 35.8–46.3)
HGB BLD-MCNC: 12.8 G/DL (ref 11.7–15.4)
HGB UR QL STRIP: NEGATIVE
IMM GRANULOCYTES # BLD AUTO: 0.1 K/UL (ref 0–0.5)
IMM GRANULOCYTES NFR BLD AUTO: 1 % (ref 0–5)
KETONES UR QL STRIP.AUTO: 40 MG/DL
LEUKOCYTE ESTERASE UR QL STRIP.AUTO: NEGATIVE
LYMPHOCYTES # BLD: 2.5 K/UL (ref 0.5–4.6)
LYMPHOCYTES NFR BLD: 57 % (ref 13–44)
MAGNESIUM SERPL-MCNC: 2 MG/DL (ref 1.8–2.4)
MCH RBC QN AUTO: 31.5 PG (ref 26.1–32.9)
MCHC RBC AUTO-ENTMCNC: 32.1 G/DL (ref 31.4–35)
MCV RBC AUTO: 98.3 FL (ref 82–102)
MONOCYTES # BLD: 0.8 K/UL (ref 0.1–1.3)
MONOCYTES NFR BLD: 19 % (ref 4–12)
MUCOUS THREADS URNS QL MICRO: 0 /LPF
NEUTS SEG # BLD: 1 K/UL (ref 1.7–8.2)
NEUTS SEG NFR BLD: 23 % (ref 43–78)
NITRITE UR QL STRIP.AUTO: NEGATIVE
NRBC # BLD: 0 K/UL (ref 0–0.2)
PH UR STRIP: 6.5 (ref 5–9)
PLATELET # BLD AUTO: 145 K/UL (ref 150–450)
PMV BLD AUTO: 9.3 FL (ref 9.4–12.3)
POTASSIUM SERPL-SCNC: 3.5 MMOL/L (ref 3.5–5.1)
PROT UR STRIP-MCNC: NEGATIVE MG/DL
RBC # BLD AUTO: 4.06 M/UL (ref 4.05–5.2)
RBC #/AREA URNS HPF: ABNORMAL /HPF
SERVICE CMNT-IMP: ABNORMAL
SERVICE CMNT-IMP: ABNORMAL
SERVICE CMNT-IMP: NORMAL
SERVICE CMNT-IMP: NORMAL
SODIUM SERPL-SCNC: 147 MMOL/L (ref 136–146)
SP GR UR REFRACTOMETRY: 1.02 (ref 1–1.02)
URINE CULTURE IF INDICATED: ABNORMAL
UROBILINOGEN UR QL STRIP.AUTO: 1 EU/DL (ref 0.2–1)
WBC # BLD AUTO: 4.4 K/UL (ref 4.3–11.1)
WBC URNS QL MICRO: 0 /HPF
YEAST URNS QL MICRO: ABNORMAL

## 2024-03-03 PROCEDURE — 51798 US URINE CAPACITY MEASURE: CPT

## 2024-03-03 PROCEDURE — 82962 GLUCOSE BLOOD TEST: CPT

## 2024-03-03 PROCEDURE — 94760 N-INVAS EAR/PLS OXIMETRY 1: CPT

## 2024-03-03 PROCEDURE — 81001 URINALYSIS AUTO W/SCOPE: CPT

## 2024-03-03 PROCEDURE — 36415 COLL VENOUS BLD VENIPUNCTURE: CPT

## 2024-03-03 PROCEDURE — 85025 COMPLETE CBC W/AUTO DIFF WBC: CPT

## 2024-03-03 PROCEDURE — 83735 ASSAY OF MAGNESIUM: CPT

## 2024-03-03 PROCEDURE — 2580000003 HC RX 258: Performed by: STUDENT IN AN ORGANIZED HEALTH CARE EDUCATION/TRAINING PROGRAM

## 2024-03-03 PROCEDURE — 97161 PT EVAL LOW COMPLEX 20 MIN: CPT

## 2024-03-03 PROCEDURE — 6370000000 HC RX 637 (ALT 250 FOR IP): Performed by: STUDENT IN AN ORGANIZED HEALTH CARE EDUCATION/TRAINING PROGRAM

## 2024-03-03 PROCEDURE — 97530 THERAPEUTIC ACTIVITIES: CPT

## 2024-03-03 PROCEDURE — 51701 INSERT BLADDER CATHETER: CPT

## 2024-03-03 PROCEDURE — 80048 BASIC METABOLIC PNL TOTAL CA: CPT

## 2024-03-03 PROCEDURE — G0378 HOSPITAL OBSERVATION PER HR: HCPCS

## 2024-03-03 PROCEDURE — 96361 HYDRATE IV INFUSION ADD-ON: CPT

## 2024-03-03 PROCEDURE — 6360000002 HC RX W HCPCS: Performed by: STUDENT IN AN ORGANIZED HEALTH CARE EDUCATION/TRAINING PROGRAM

## 2024-03-03 PROCEDURE — 96372 THER/PROPH/DIAG INJ SC/IM: CPT

## 2024-03-03 RX ORDER — DEXTROSE MONOHYDRATE 50 MG/ML
INJECTION, SOLUTION INTRAVENOUS CONTINUOUS
Status: ACTIVE | OUTPATIENT
Start: 2024-03-03 | End: 2024-03-03

## 2024-03-03 RX ORDER — DEXTROSE MONOHYDRATE 50 MG/ML
INJECTION, SOLUTION INTRAVENOUS CONTINUOUS
Status: DISCONTINUED | OUTPATIENT
Start: 2024-03-03 | End: 2024-03-04

## 2024-03-03 RX ADMIN — DIVALPROEX SODIUM 750 MG: 250 TABLET, DELAYED RELEASE ORAL at 09:09

## 2024-03-03 RX ADMIN — QUETIAPINE FUMARATE 400 MG: 100 TABLET ORAL at 09:09

## 2024-03-03 RX ADMIN — AMANTADINE HYDROCHLORIDE 100 MG: 100 CAPSULE, LIQUID FILLED ORAL at 21:52

## 2024-03-03 RX ADMIN — FAMOTIDINE 20 MG: 20 TABLET, FILM COATED ORAL at 09:09

## 2024-03-03 RX ADMIN — RISPERIDONE 0.5 MG: 0.5 TABLET ORAL at 21:52

## 2024-03-03 RX ADMIN — AMANTADINE HYDROCHLORIDE 100 MG: 100 CAPSULE, LIQUID FILLED ORAL at 09:09

## 2024-03-03 RX ADMIN — CLONAZEPAM 1 MG: 1 TABLET ORAL at 21:52

## 2024-03-03 RX ADMIN — ENOXAPARIN SODIUM 40 MG: 100 INJECTION SUBCUTANEOUS at 09:18

## 2024-03-03 RX ADMIN — QUETIAPINE FUMARATE 400 MG: 100 TABLET ORAL at 21:52

## 2024-03-03 RX ADMIN — DEXTROSE MONOHYDRATE: 50 INJECTION, SOLUTION INTRAVENOUS at 22:03

## 2024-03-03 RX ADMIN — DEXTROSE MONOHYDRATE: 50 INJECTION, SOLUTION INTRAVENOUS at 05:52

## 2024-03-03 RX ADMIN — DIVALPROEX SODIUM 750 MG: 250 TABLET, DELAYED RELEASE ORAL at 21:52

## 2024-03-03 RX ADMIN — CLONAZEPAM 1 MG: 1 TABLET ORAL at 09:09

## 2024-03-03 RX ADMIN — RISPERIDONE 0.5 MG: 0.5 TABLET ORAL at 09:09

## 2024-03-03 RX ADMIN — METOPROLOL SUCCINATE 25 MG: 25 TABLET, EXTENDED RELEASE ORAL at 09:09

## 2024-03-03 RX ADMIN — ROSUVASTATIN CALCIUM 10 MG: 5 TABLET, COATED ORAL at 21:51

## 2024-03-03 RX ADMIN — FAMOTIDINE 20 MG: 20 TABLET, FILM COATED ORAL at 21:52

## 2024-03-03 NOTE — H&P
Total Bilirubin 0.2 0.2 - 1.1 MG/DL    ALT 33 12 - 65 U/L    AST 46 (H) 15 - 37 U/L    Alk Phosphatase 47 (L) 50 - 130 U/L    Total Protein 6.6 6.3 - 8.2 g/dL    Albumin 2.7 (L) 3.5 - 5.0 g/dL    Globulin 3.9 2.8 - 4.5 g/dL    Albumin/Globulin Ratio 0.7 0.4 - 1.6     TSH with Reflex    Collection Time: 03/02/24 11:11 AM   Result Value Ref Range    TSH w Free Thyroid if Abnormal 2.16 0.358 - 3.740 UIU/ML   Procalcitonin    Collection Time: 03/02/24 11:11 AM   Result Value Ref Range    Procalcitonin <0.05 0.00 - 0.49 ng/mL   C-Reactive Protein    Collection Time: 03/02/24 11:11 AM   Result Value Ref Range    CRP 2.2 (H) 0.0 - 0.9 mg/dL   Lactic Acid Now and in 2 Hours    Collection Time: 03/02/24 11:28 AM   Result Value Ref Range    Lactic Acid, Plasma 1.3 0.4 - 2.0 MMOL/L   EKG 12 Lead    Collection Time: 03/02/24 11:59 AM   Result Value Ref Range    Ventricular Rate 92 BPM    Atrial Rate 92 BPM    P-R Interval 155 ms    QRS Duration 109 ms    Q-T Interval 365 ms    QTc Calculation (Bazett) 452 ms    P Axis 80 degrees    R Axis -57 degrees    T Axis 77 degrees    Diagnosis       Sinus rhythm  Abnormal R-wave progression, early transition  Left ventricular hypertrophy  Inferior infarct, old    Confirmed by Adis LOPEZ Miller ([id number]) on 3/2/2024 7:11:10 PM     COVID-19, Rapid    Collection Time: 03/02/24 12:02 PM    Specimen: Nasopharyngeal   Result Value Ref Range    Source NASAL SWAB      SARS-CoV-2, Rapid Detected (AA) NOTD     Influenza A/B, Molecular    Collection Time: 03/02/24 12:02 PM    Specimen: Nasopharyngeal   Result Value Ref Range    Influenza A, NAV Not detected NOTD      Influenza B, NAV Not detected NOTD     POCT Urinalysis no Micro    Collection Time: 03/02/24  4:58 PM   Result Value Ref Range    Specific Gravity, Urine, POC >1.030 (H) 1.001 - 1.023    pH, Urine, POC 6.5 5.0 - 9.0      Protein, Urine, POC Negative NEG mg/dL    Glucose, UA  (A) NEG mg/dL    Ketones, Urine, POC 40 (A) NEG mg/dL

## 2024-03-03 NOTE — CARE COORDINATION
53 yr-old F with weakness and COVID +.  [facility] is [facility] [place].  Spoke with Lang Hamilton ([phone]).  She said normally Kenia is totally independent with ADL's.  Kenia said we would need to speak to Shivani Jara [phone]) to make arrangements for Kenia's return.       03/03/24 7836   Service Assessment   Patient Orientation Unable to Assess   Cognition Other (see comment)  (AMS)   History Provided By Medical Record   Primary Caregiver Other (Comment)  (Group Home)   Support Systems Other (Comment)  (Group Home)   Patient's Healthcare Decision Maker is: Legal Next of Kin   PCP Verified by CM Yes   Last Visit to PCP Within last 6 months   Prior Functional Level Independent in ADLs/IADLs   Current Functional Level Assistance with the following:;Bathing;Dressing   Can patient return to prior living arrangement Yes   Ability to make needs known: Fair   Family able to assist with home care needs: No   Would you like for me to discuss the discharge plan with any other family members/significant others, and if so, who? No   Financial Resources Medicaid   Community Resources Other (Comment)  (Group Home)

## 2024-03-03 NOTE — ED NOTES
TRANSFER - OUT REPORT:    Verbal report given to JAZMIN Worrell  on Kenia Levy  being transferred to [facility]  for routine progression of patient care       Report consisted of patient's Situation, Background, Assessment and   Recommendations(SBAR).     Information from the following report(s) ED SBAR was reviewed with the receiving nurse.    Lines:   Peripheral IV 03/02/24 Left Antecubital (Active)   Site Assessment Clean, dry & intact 03/02/24 1124   Line Status Blood return noted;Specimen collected;Flushed;Normal saline locked 03/02/24 1124   Phlebitis Assessment No symptoms 03/02/24 1124   Infiltration Assessment 0 03/02/24 1124        Opportunity for questions and clarification was provided.      Patient transported with:  Tech

## 2024-03-03 NOTE — RT PROTOCOL NOTE
Respiratory Care Services     Policy Number: [id number]    Title: Oxygen Protocol    Effective Date: 01/1996    Revised Date: 06/2013, 02/29/2016, 4/2018, 7/2019    Reviewed Date: 05/2014, 03/2015, 06/2017, 11/2020        I.  Policy: The Oxygen Protocol will be initiated for all patients upon written order from physician for administration of oxygen therapy or if a patient is found to have an oxygen saturation of 88% or less. Special consideration: the goal of oxygen therapy for COPD patients is to maintain oxygen saturation between 88% - 92% to comply with GOLD Guidelines.    II. Purpose: To provide protocol driven respiratory therapy for the administration of oxygen at concentrations greater than that in ambient air with the intent of treating or preventing the symptoms and manifestations of hypoxia.    III. Responsibility: Director Respiratory Care Services, all Respiratory Care Practitioners     IV. Indications:   Implement this protocol for patients when physician orders oxygen to be administered or when patient is found to have an oxygen saturation of 88% or less.  To assure routine monitoring of patient's oxygen saturation b.i.d. and to make appropriate adjustments in accordance with ordered oxygen saturation parameters.  To assure continuity of respiratory care that meets [facility] Clinical Practice Guidelines and GOLD Guidelines.  Hb < 8  Sickle Cell anemia crisis    V. Assessment:  Assess the following parameters to determine the need to adjust oxygen:  Measurement of patient's oxygen saturation via pulse oximetry.    Observation of patient's color, respiratory effort, and responsiveness.  Measurement of heart rate and respiratory rate.  Complete a three-step ambulatory oxygen saturation when ordered.    VI. Initiation:  Upon receipt of an order for oxygen, the RCP will:   Verify order in the patient's EMR, which should include the desired oxygen saturation to be maintained.  The patient shall be placed on

## 2024-03-03 NOTE — PLAN OF CARE
Problem: Discharge Planning  Goal: Discharge to home or other facility with appropriate resources  Outcome: Progressing      03/02/24 2100   Dual Clinician Skin Assessment   Dual Skin Assessment (4 Eyes) WDL   Second Clinical  (First and Last Name) JAZMIN De   Skin Integumentary    Skin Integumentary (WDL) X   Skin Color Red  (left lower leg)   Skin Condition/Temp Flaky  (flaky heels)   Skin Integrity Scars (comment)  (left lowr leg)     TRANSFER - IN REPORT:    Verbal report received from JAZMIN Gilliland on Kenia Levy  being received from ED for routine progression of patient care      Report consisted of patient's Situation, Background, Assessment and   Recommendations(SBAR).     Information from the following report(s) Nurse Handoff Report was reviewed with the receiving nurse.    Opportunity for questions and clarification was provided.      Assessment completed upon patient's arrival to unit and care assumed.

## 2024-03-04 ENCOUNTER — HOME HEALTH ADMISSION (OUTPATIENT)
Dept: HOME HEALTH SERVICES | Facility: HOME HEALTH | Age: 54
End: 2024-03-04
Payer: MEDICAID

## 2024-03-04 LAB
ALBUMIN SERPL-MCNC: 2.6 G/DL (ref 3.5–5)
ALBUMIN/GLOB SERPL: 0.6 (ref 0.4–1.6)
ALP SERPL-CCNC: 49 U/L (ref 50–136)
ALT SERPL-CCNC: 33 U/L (ref 12–65)
ANION GAP SERPL CALC-SCNC: 7 MMOL/L (ref 2–11)
AST SERPL-CCNC: 40 U/L (ref 15–37)
BASOPHILS # BLD: 0 K/UL (ref 0–0.2)
BASOPHILS NFR BLD: 1 % (ref 0–2)
BILIRUB SERPL-MCNC: 0.2 MG/DL (ref 0.2–1.1)
BUN SERPL-MCNC: 4 MG/DL (ref 6–23)
CALCIUM SERPL-MCNC: 9.3 MG/DL (ref 8.3–10.4)
CHLORIDE SERPL-SCNC: 110 MMOL/L (ref 103–113)
CO2 SERPL-SCNC: 26 MMOL/L (ref 21–32)
CREAT SERPL-MCNC: 0.67 MG/DL (ref 0.6–1)
DIFFERENTIAL METHOD BLD: ABNORMAL
EOSINOPHIL # BLD: 0 K/UL (ref 0–0.8)
EOSINOPHIL NFR BLD: 0 % (ref 0.5–7.8)
ERYTHROCYTE [DISTWIDTH] IN BLOOD BY AUTOMATED COUNT: 13.9 % (ref 11.9–14.6)
GLOBULIN SER CALC-MCNC: 4.5 G/DL (ref 2.8–4.5)
GLUCOSE BLD STRIP.AUTO-MCNC: 105 MG/DL (ref 65–100)
GLUCOSE BLD STRIP.AUTO-MCNC: 105 MG/DL (ref 65–100)
GLUCOSE BLD STRIP.AUTO-MCNC: 117 MG/DL (ref 65–100)
GLUCOSE SERPL-MCNC: 115 MG/DL (ref 65–100)
HCT VFR BLD AUTO: 47.2 % (ref 35.8–46.3)
HGB BLD-MCNC: 15.1 G/DL (ref 11.7–15.4)
IMM GRANULOCYTES # BLD AUTO: 0 K/UL (ref 0–0.5)
IMM GRANULOCYTES NFR BLD AUTO: 1 % (ref 0–5)
LYMPHOCYTES # BLD: 2.4 K/UL (ref 0.5–4.6)
LYMPHOCYTES NFR BLD: 61 % (ref 13–44)
MCH RBC QN AUTO: 32.1 PG (ref 26.1–32.9)
MCHC RBC AUTO-ENTMCNC: 32 G/DL (ref 31.4–35)
MCV RBC AUTO: 100.2 FL (ref 82–102)
MM INDURATION, POC: 0 MM (ref 0–5)
MONOCYTES # BLD: 0.4 K/UL (ref 0.1–1.3)
MONOCYTES NFR BLD: 12 % (ref 4–12)
NEUTS SEG # BLD: 0.9 K/UL (ref 1.7–8.2)
NEUTS SEG NFR BLD: 25 % (ref 43–78)
NRBC # BLD: 0 K/UL (ref 0–0.2)
PLATELET # BLD AUTO: 127 K/UL (ref 150–450)
PLATELET COMMENT: ABNORMAL
PMV BLD AUTO: 9.8 FL (ref 9.4–12.3)
POTASSIUM SERPL-SCNC: 3.7 MMOL/L (ref 3.5–5.1)
PPD, POC: NEGATIVE
PROT SERPL-MCNC: 7.1 G/DL (ref 6.3–8.2)
RBC # BLD AUTO: 4.71 M/UL (ref 4.05–5.2)
RBC MORPH BLD: ABNORMAL
RBC MORPH BLD: SLIGHT
SERVICE CMNT-IMP: ABNORMAL
SODIUM SERPL-SCNC: 143 MMOL/L (ref 136–146)
WBC # BLD AUTO: 3.7 K/UL (ref 4.3–11.1)
WBC MORPH BLD: ABNORMAL

## 2024-03-04 PROCEDURE — G0378 HOSPITAL OBSERVATION PER HR: HCPCS

## 2024-03-04 PROCEDURE — 6360000002 HC RX W HCPCS: Performed by: STUDENT IN AN ORGANIZED HEALTH CARE EDUCATION/TRAINING PROGRAM

## 2024-03-04 PROCEDURE — 36415 COLL VENOUS BLD VENIPUNCTURE: CPT

## 2024-03-04 PROCEDURE — 80053 COMPREHEN METABOLIC PANEL: CPT

## 2024-03-04 PROCEDURE — 6370000000 HC RX 637 (ALT 250 FOR IP): Performed by: STUDENT IN AN ORGANIZED HEALTH CARE EDUCATION/TRAINING PROGRAM

## 2024-03-04 PROCEDURE — 82962 GLUCOSE BLOOD TEST: CPT

## 2024-03-04 PROCEDURE — 96361 HYDRATE IV INFUSION ADD-ON: CPT

## 2024-03-04 PROCEDURE — 96372 THER/PROPH/DIAG INJ SC/IM: CPT

## 2024-03-04 PROCEDURE — 85025 COMPLETE CBC W/AUTO DIFF WBC: CPT

## 2024-03-04 PROCEDURE — 97530 THERAPEUTIC ACTIVITIES: CPT

## 2024-03-04 RX ORDER — BENZONATATE 100 MG/1
100 CAPSULE ORAL 3 TIMES DAILY PRN
Qty: 30 CAPSULE | Refills: 0 | Status: SHIPPED | OUTPATIENT
Start: 2024-03-04 | End: 2024-03-14

## 2024-03-04 RX ADMIN — CLONAZEPAM 1 MG: 1 TABLET ORAL at 20:58

## 2024-03-04 RX ADMIN — DIVALPROEX SODIUM 750 MG: 250 TABLET, DELAYED RELEASE ORAL at 20:59

## 2024-03-04 RX ADMIN — INSULIN GLARGINE 10 UNITS: 100 INJECTION, SOLUTION SUBCUTANEOUS at 09:01

## 2024-03-04 RX ADMIN — QUETIAPINE FUMARATE 400 MG: 100 TABLET ORAL at 20:58

## 2024-03-04 RX ADMIN — ROSUVASTATIN CALCIUM 10 MG: 5 TABLET, COATED ORAL at 20:59

## 2024-03-04 RX ADMIN — CLONAZEPAM 1 MG: 1 TABLET ORAL at 09:00

## 2024-03-04 RX ADMIN — ENOXAPARIN SODIUM 40 MG: 100 INJECTION SUBCUTANEOUS at 09:00

## 2024-03-04 RX ADMIN — FAMOTIDINE 20 MG: 20 TABLET, FILM COATED ORAL at 09:00

## 2024-03-04 RX ADMIN — AMANTADINE HYDROCHLORIDE 100 MG: 100 CAPSULE, LIQUID FILLED ORAL at 09:00

## 2024-03-04 RX ADMIN — AMANTADINE HYDROCHLORIDE 100 MG: 100 CAPSULE, LIQUID FILLED ORAL at 20:58

## 2024-03-04 RX ADMIN — RISPERIDONE 0.5 MG: 0.5 TABLET ORAL at 09:00

## 2024-03-04 RX ADMIN — PANTOPRAZOLE SODIUM 40 MG: 40 TABLET, DELAYED RELEASE ORAL at 06:01

## 2024-03-04 RX ADMIN — METOPROLOL SUCCINATE 25 MG: 25 TABLET, EXTENDED RELEASE ORAL at 09:00

## 2024-03-04 RX ADMIN — QUETIAPINE FUMARATE 400 MG: 100 TABLET ORAL at 09:00

## 2024-03-04 RX ADMIN — DIVALPROEX SODIUM 750 MG: 250 TABLET, DELAYED RELEASE ORAL at 09:00

## 2024-03-04 RX ADMIN — FAMOTIDINE 20 MG: 20 TABLET, FILM COATED ORAL at 20:58

## 2024-03-04 RX ADMIN — RISPERIDONE 0.5 MG: 0.5 TABLET ORAL at 20:58

## 2024-03-04 NOTE — CARE COORDINATION
Pt is for discharge home today with home health services.  Referral called/faxed to [facility] for follow up home care as ordered. Spoke with group [facility] [facility] that was in agreement for patient to return to facility today. They had no preference on HH company. Someone from facility to pick patient up from hospital and transport back to facility. No additional CM orders received or supportive care needs expressed at this time.       03/04/24 1101   Service Assessment   Patient's Healthcare Decision Maker is: Legal Next of Kin   Services At/After Discharge   Transition of Care Consult (CM Consult) Home Health   Internal Home Health Yes   Services At/After Discharge PT;OT;Nursing services;Home Health    Resource Information Provided? No   Mode of Transport at Discharge Other (see comment)   Confirm Follow Up Transport Other (see comment)  (Group home staff)   Condition of Participation: Discharge Planning   The Plan for Transition of Care is related to the following treatment goals: The patient is returning to her group home with  services.   The Patient and/or Patient Representative was provided with a Choice of Provider? Patient   The Patient and/Or Patient Representative agree with the Discharge Plan? Yes   Freedom of Choice list was provided with basic dialogue that supports the patient's individualized plan of care/goals, treatment preferences, and shares the quality data associated with the providers?  Yes

## 2024-03-04 NOTE — DISCHARGE SUMMARY
Hospitalist Discharge Summary   Admit Date:  3/2/2024 10:40 AM   DC Note date: 3/4/2024  Name:  Kenia Levy   Age:  53 y.o.  Sex:  female  :  1970   MRN:  [id number]   Room:  [facility]  PCP:  Shari Jeter MD    Presenting Complaint: Altered Mental Status     Initial Admission Diagnosis: Generalized weakness [R53.1]  Altered mental status, unspecified altered mental status type [R41.82]  COVID [U07.1]     Problem List for this Hospitalization (present on admission):    Principal Problem:    Generalized weakness  Resolved Problems:    * No resolved hospital problems. *      Hospital Course:  53-year-old female with schizoaffective disorder, IDDM 2 presents from her group home 3/2 with lethargy, decreased mental status and found to be COVID-19 positive.    Patient presented with COVID-19 infection without hypoxemia.  Was no indication for dexamethasone.  Patient was continued on supportive care tolerated well.  UA negative.  For patient's other comorbid condition she was restarted back on her home medications.  Patient was advised she needs to follow-up with her primary care provider within next 3 to 5 days to discuss recent hospitalization and any changes made to her medications.  All questions answered.    Disposition: SNF Rehab  Diet: ADULT DIET; Regular; 5 carb choices (75 gm/meal)  Code Status: Full Code    Follow Ups:  Follow-up Information       Follow up With Specialties Details Why Contact Info    Shari Jeter MD Internal Medicine Follow up in 3 day(s)  [address] [facility] [address] [place], [place] [id number] [phone]              Time spent in patient discharge and coordination 45 minutes.        Follow up labs/diagnostics (ultimately defer to outpatient provider):  Defer to Follow-up Provider    Plan was discussed with Patient.  All questions answered.  Patient was stable at time of discharge.  Instructions given to call a physician or return if any concerns.    Pending Labs

## 2024-03-05 LAB
GLUCOSE BLD STRIP.AUTO-MCNC: 105 MG/DL (ref 65–100)
GLUCOSE BLD STRIP.AUTO-MCNC: 118 MG/DL (ref 65–100)
GLUCOSE BLD STRIP.AUTO-MCNC: 177 MG/DL (ref 65–100)
GLUCOSE BLD STRIP.AUTO-MCNC: 90 MG/DL (ref 65–100)
SERVICE CMNT-IMP: ABNORMAL
SERVICE CMNT-IMP: NORMAL

## 2024-03-05 PROCEDURE — 97530 THERAPEUTIC ACTIVITIES: CPT

## 2024-03-05 PROCEDURE — 96372 THER/PROPH/DIAG INJ SC/IM: CPT

## 2024-03-05 PROCEDURE — 6360000002 HC RX W HCPCS: Performed by: STUDENT IN AN ORGANIZED HEALTH CARE EDUCATION/TRAINING PROGRAM

## 2024-03-05 PROCEDURE — 6370000000 HC RX 637 (ALT 250 FOR IP): Performed by: STUDENT IN AN ORGANIZED HEALTH CARE EDUCATION/TRAINING PROGRAM

## 2024-03-05 PROCEDURE — 97535 SELF CARE MNGMENT TRAINING: CPT

## 2024-03-05 PROCEDURE — 82962 GLUCOSE BLOOD TEST: CPT

## 2024-03-05 PROCEDURE — G0378 HOSPITAL OBSERVATION PER HR: HCPCS

## 2024-03-05 PROCEDURE — 97165 OT EVAL LOW COMPLEX 30 MIN: CPT

## 2024-03-05 RX ADMIN — DIVALPROEX SODIUM 750 MG: 250 TABLET, DELAYED RELEASE ORAL at 09:15

## 2024-03-05 RX ADMIN — METOPROLOL SUCCINATE 25 MG: 25 TABLET, EXTENDED RELEASE ORAL at 09:16

## 2024-03-05 RX ADMIN — FAMOTIDINE 20 MG: 20 TABLET, FILM COATED ORAL at 21:50

## 2024-03-05 RX ADMIN — DIVALPROEX SODIUM 750 MG: 250 TABLET, DELAYED RELEASE ORAL at 21:49

## 2024-03-05 RX ADMIN — AMANTADINE HYDROCHLORIDE 100 MG: 100 CAPSULE, LIQUID FILLED ORAL at 21:50

## 2024-03-05 RX ADMIN — ROSUVASTATIN CALCIUM 10 MG: 5 TABLET, COATED ORAL at 21:50

## 2024-03-05 RX ADMIN — INSULIN GLARGINE 10 UNITS: 100 INJECTION, SOLUTION SUBCUTANEOUS at 09:16

## 2024-03-05 RX ADMIN — FAMOTIDINE 20 MG: 20 TABLET, FILM COATED ORAL at 09:16

## 2024-03-05 RX ADMIN — CLONAZEPAM 1 MG: 1 TABLET ORAL at 21:50

## 2024-03-05 RX ADMIN — PANTOPRAZOLE SODIUM 40 MG: 40 TABLET, DELAYED RELEASE ORAL at 05:54

## 2024-03-05 RX ADMIN — CLONAZEPAM 1 MG: 1 TABLET ORAL at 09:15

## 2024-03-05 RX ADMIN — QUETIAPINE FUMARATE 400 MG: 100 TABLET ORAL at 21:50

## 2024-03-05 RX ADMIN — QUETIAPINE FUMARATE 400 MG: 100 TABLET ORAL at 09:15

## 2024-03-05 RX ADMIN — ENOXAPARIN SODIUM 40 MG: 100 INJECTION SUBCUTANEOUS at 09:16

## 2024-03-05 RX ADMIN — RISPERIDONE 0.5 MG: 0.5 TABLET ORAL at 09:15

## 2024-03-05 RX ADMIN — AMANTADINE HYDROCHLORIDE 100 MG: 100 CAPSULE, LIQUID FILLED ORAL at 09:15

## 2024-03-05 RX ADMIN — RISPERIDONE 0.5 MG: 0.5 TABLET ORAL at 21:50

## 2024-03-06 LAB
ALBUMIN SERPL-MCNC: 2.4 G/DL (ref 3.5–5)
ALBUMIN/GLOB SERPL: 0.6 (ref 0.4–1.6)
ALP SERPL-CCNC: 47 U/L (ref 50–130)
ALT SERPL-CCNC: 25 U/L (ref 12–65)
ANION GAP SERPL CALC-SCNC: 5 MMOL/L (ref 2–11)
AST SERPL-CCNC: 33 U/L (ref 15–37)
BASOPHILS # BLD: 0 K/UL (ref 0–0.2)
BASOPHILS NFR BLD: 1 % (ref 0–2)
BILIRUB SERPL-MCNC: 0.2 MG/DL (ref 0.2–1.1)
BUN SERPL-MCNC: 12 MG/DL (ref 6–23)
CALCIUM SERPL-MCNC: 9 MG/DL (ref 8.3–10.4)
CHLORIDE SERPL-SCNC: 110 MMOL/L (ref 103–113)
CO2 SERPL-SCNC: 28 MMOL/L (ref 21–32)
CREAT SERPL-MCNC: 0.68 MG/DL (ref 0.6–1)
DIFFERENTIAL METHOD BLD: ABNORMAL
EOSINOPHIL # BLD: 0 K/UL (ref 0–0.8)
EOSINOPHIL NFR BLD: 0 % (ref 0.5–7.8)
ERYTHROCYTE [DISTWIDTH] IN BLOOD BY AUTOMATED COUNT: 13.6 % (ref 11.9–14.6)
GLOBULIN SER CALC-MCNC: 4.2 G/DL (ref 2.8–4.5)
GLUCOSE BLD STRIP.AUTO-MCNC: 103 MG/DL (ref 65–100)
GLUCOSE BLD STRIP.AUTO-MCNC: 119 MG/DL (ref 65–100)
GLUCOSE BLD STRIP.AUTO-MCNC: 174 MG/DL (ref 65–100)
GLUCOSE BLD STRIP.AUTO-MCNC: 97 MG/DL (ref 65–100)
GLUCOSE SERPL-MCNC: 115 MG/DL (ref 65–100)
HCT VFR BLD AUTO: 40.7 % (ref 35.8–46.3)
HGB BLD-MCNC: 13 G/DL (ref 11.7–15.4)
IMM GRANULOCYTES # BLD AUTO: 0 K/UL (ref 0–0.5)
IMM GRANULOCYTES NFR BLD AUTO: 1 % (ref 0–5)
LYMPHOCYTES # BLD: 3.4 K/UL (ref 0.5–4.6)
LYMPHOCYTES NFR BLD: 71 % (ref 13–44)
MCH RBC QN AUTO: 31.9 PG (ref 26.1–32.9)
MCHC RBC AUTO-ENTMCNC: 31.9 G/DL (ref 31.4–35)
MCV RBC AUTO: 99.8 FL (ref 82–102)
MONOCYTES # BLD: 0.4 K/UL (ref 0.1–1.3)
MONOCYTES NFR BLD: 9 % (ref 4–12)
NEUTS SEG # BLD: 0.9 K/UL (ref 1.7–8.2)
NEUTS SEG NFR BLD: 19 % (ref 43–78)
NRBC # BLD: 0 K/UL (ref 0–0.2)
PLATELET # BLD AUTO: 172 K/UL (ref 150–450)
PMV BLD AUTO: 9.4 FL (ref 9.4–12.3)
POTASSIUM SERPL-SCNC: 3.7 MMOL/L (ref 3.5–5.1)
PROT SERPL-MCNC: 6.6 G/DL (ref 6.3–8.2)
RBC # BLD AUTO: 4.08 M/UL (ref 4.05–5.2)
SERVICE CMNT-IMP: ABNORMAL
SERVICE CMNT-IMP: NORMAL
SODIUM SERPL-SCNC: 143 MMOL/L (ref 136–146)
WBC # BLD AUTO: 4.8 K/UL (ref 4.3–11.1)

## 2024-03-06 PROCEDURE — 82962 GLUCOSE BLOOD TEST: CPT

## 2024-03-06 PROCEDURE — G0378 HOSPITAL OBSERVATION PER HR: HCPCS

## 2024-03-06 PROCEDURE — 6370000000 HC RX 637 (ALT 250 FOR IP): Performed by: STUDENT IN AN ORGANIZED HEALTH CARE EDUCATION/TRAINING PROGRAM

## 2024-03-06 PROCEDURE — 96372 THER/PROPH/DIAG INJ SC/IM: CPT

## 2024-03-06 PROCEDURE — 80053 COMPREHEN METABOLIC PANEL: CPT

## 2024-03-06 PROCEDURE — 97530 THERAPEUTIC ACTIVITIES: CPT

## 2024-03-06 PROCEDURE — 6360000002 HC RX W HCPCS: Performed by: STUDENT IN AN ORGANIZED HEALTH CARE EDUCATION/TRAINING PROGRAM

## 2024-03-06 PROCEDURE — 85025 COMPLETE CBC W/AUTO DIFF WBC: CPT

## 2024-03-06 PROCEDURE — 36415 COLL VENOUS BLD VENIPUNCTURE: CPT

## 2024-03-06 RX ADMIN — ROSUVASTATIN CALCIUM 10 MG: 5 TABLET, COATED ORAL at 22:09

## 2024-03-06 RX ADMIN — ENOXAPARIN SODIUM 40 MG: 100 INJECTION SUBCUTANEOUS at 09:09

## 2024-03-06 RX ADMIN — METOPROLOL SUCCINATE 25 MG: 25 TABLET, EXTENDED RELEASE ORAL at 09:09

## 2024-03-06 RX ADMIN — CLONAZEPAM 1 MG: 1 TABLET ORAL at 09:09

## 2024-03-06 RX ADMIN — RISPERIDONE 0.5 MG: 0.5 TABLET ORAL at 09:09

## 2024-03-06 RX ADMIN — QUETIAPINE FUMARATE 400 MG: 100 TABLET ORAL at 09:08

## 2024-03-06 RX ADMIN — FAMOTIDINE 20 MG: 20 TABLET, FILM COATED ORAL at 22:09

## 2024-03-06 RX ADMIN — INSULIN GLARGINE 10 UNITS: 100 INJECTION, SOLUTION SUBCUTANEOUS at 09:10

## 2024-03-06 RX ADMIN — AMANTADINE HYDROCHLORIDE 100 MG: 100 CAPSULE, LIQUID FILLED ORAL at 09:09

## 2024-03-06 RX ADMIN — FAMOTIDINE 20 MG: 20 TABLET, FILM COATED ORAL at 09:09

## 2024-03-06 RX ADMIN — CLONAZEPAM 1 MG: 1 TABLET ORAL at 22:09

## 2024-03-06 RX ADMIN — RISPERIDONE 0.5 MG: 0.5 TABLET ORAL at 22:09

## 2024-03-06 RX ADMIN — AMANTADINE HYDROCHLORIDE 100 MG: 100 CAPSULE, LIQUID FILLED ORAL at 22:09

## 2024-03-06 RX ADMIN — PANTOPRAZOLE SODIUM 40 MG: 40 TABLET, DELAYED RELEASE ORAL at 05:52

## 2024-03-06 RX ADMIN — QUETIAPINE FUMARATE 400 MG: 100 TABLET ORAL at 22:08

## 2024-03-06 RX ADMIN — DIVALPROEX SODIUM 750 MG: 250 TABLET, DELAYED RELEASE ORAL at 09:09

## 2024-03-06 RX ADMIN — DIVALPROEX SODIUM 750 MG: 250 TABLET, DELAYED RELEASE ORAL at 22:08

## 2024-03-06 NOTE — PLAN OF CARE
Problem: Safety - Adult  Goal: Free from fall injury  Outcome: Progressing     Problem: Skin/Tissue Integrity  Goal: Absence of new skin breakdown  Description: 1.  Monitor for areas of redness and/or skin breakdown  2.  Assess vascular access sites hourly  3.  Every 4-6 hours minimum:  Change oxygen saturation probe site  4.  Every 4-6 hours:  If on nasal continuous positive airway pressure, respiratory therapy assess nares and determine need for appliance change or resting period.  Outcome: Progressing     Problem: ABCDS Injury Assessment  Goal: Absence of physical injury  Outcome: Progressing     Problem: Discharge Planning  Goal: Discharge to home or other facility with appropriate resources  Outcome: Progressing

## 2024-03-07 LAB
BACTERIA SPEC CULT: NORMAL
GLUCOSE BLD STRIP.AUTO-MCNC: 145 MG/DL (ref 65–100)
GLUCOSE BLD STRIP.AUTO-MCNC: 155 MG/DL (ref 65–100)
GLUCOSE BLD STRIP.AUTO-MCNC: 182 MG/DL (ref 65–100)
GLUCOSE BLD STRIP.AUTO-MCNC: 87 MG/DL (ref 65–100)
SERVICE CMNT-IMP: ABNORMAL
SERVICE CMNT-IMP: NORMAL
SERVICE CMNT-IMP: NORMAL

## 2024-03-07 PROCEDURE — G0378 HOSPITAL OBSERVATION PER HR: HCPCS

## 2024-03-07 PROCEDURE — 82962 GLUCOSE BLOOD TEST: CPT

## 2024-03-07 PROCEDURE — 92610 EVALUATE SWALLOWING FUNCTION: CPT

## 2024-03-07 PROCEDURE — 6360000002 HC RX W HCPCS: Performed by: STUDENT IN AN ORGANIZED HEALTH CARE EDUCATION/TRAINING PROGRAM

## 2024-03-07 PROCEDURE — 6370000000 HC RX 637 (ALT 250 FOR IP): Performed by: STUDENT IN AN ORGANIZED HEALTH CARE EDUCATION/TRAINING PROGRAM

## 2024-03-07 PROCEDURE — 97535 SELF CARE MNGMENT TRAINING: CPT

## 2024-03-07 PROCEDURE — 96372 THER/PROPH/DIAG INJ SC/IM: CPT

## 2024-03-07 PROCEDURE — 97530 THERAPEUTIC ACTIVITIES: CPT

## 2024-03-07 RX ADMIN — DIVALPROEX SODIUM 750 MG: 250 TABLET, DELAYED RELEASE ORAL at 21:44

## 2024-03-07 RX ADMIN — AMANTADINE HYDROCHLORIDE 100 MG: 100 CAPSULE, LIQUID FILLED ORAL at 21:44

## 2024-03-07 RX ADMIN — RISPERIDONE 0.5 MG: 0.5 TABLET ORAL at 08:47

## 2024-03-07 RX ADMIN — CLONAZEPAM 1 MG: 1 TABLET ORAL at 08:47

## 2024-03-07 RX ADMIN — AMANTADINE HYDROCHLORIDE 100 MG: 100 CAPSULE, LIQUID FILLED ORAL at 08:46

## 2024-03-07 RX ADMIN — FAMOTIDINE 20 MG: 20 TABLET, FILM COATED ORAL at 21:44

## 2024-03-07 RX ADMIN — METOPROLOL SUCCINATE 25 MG: 25 TABLET, EXTENDED RELEASE ORAL at 08:47

## 2024-03-07 RX ADMIN — ROSUVASTATIN CALCIUM 10 MG: 5 TABLET, COATED ORAL at 21:44

## 2024-03-07 RX ADMIN — FAMOTIDINE 20 MG: 20 TABLET, FILM COATED ORAL at 08:46

## 2024-03-07 RX ADMIN — RISPERIDONE 0.5 MG: 0.5 TABLET ORAL at 21:44

## 2024-03-07 RX ADMIN — PANTOPRAZOLE SODIUM 40 MG: 40 TABLET, DELAYED RELEASE ORAL at 06:34

## 2024-03-07 RX ADMIN — QUETIAPINE FUMARATE 400 MG: 100 TABLET ORAL at 08:46

## 2024-03-07 RX ADMIN — DIVALPROEX SODIUM 750 MG: 250 TABLET, DELAYED RELEASE ORAL at 08:47

## 2024-03-07 RX ADMIN — INSULIN GLARGINE 10 UNITS: 100 INJECTION, SOLUTION SUBCUTANEOUS at 08:47

## 2024-03-07 RX ADMIN — QUETIAPINE FUMARATE 400 MG: 100 TABLET ORAL at 21:44

## 2024-03-07 RX ADMIN — ENOXAPARIN SODIUM 40 MG: 100 INJECTION SUBCUTANEOUS at 08:47

## 2024-03-07 RX ADMIN — CLONAZEPAM 1 MG: 1 TABLET ORAL at 21:44

## 2024-03-07 NOTE — CARE COORDINATION
Alcides called Chas Steinberg CM ([phone]) for the group home to discuss pt. He was not very helpful and did not have detailed information about her level of care or needs. He asked Sw to call Alexa Singh supervisor ([phone]) of the group home to discuss. Sw called her and attempted to report pt's current level of care. She informed pt's roommates who also had COVID have all returned to the group home. She informed pt usually does all her own showers and feeds self. Alcides informed pt only has Medicaid and finding a Medicaid placement would be extremely difficult. Medicaid does not pay for short term rehab. PT and OT are diligently working with pt two times a day. Pt is not back to baseline nor to the level that her group home can manage. Alcides received a call from Shivani Jara ([phone]) who is the director. Long discussion with her about pt. She is very concerned that hospital is trying to discharge her again as she is not back to the level that the facility requires. Alcides informed about [facility] services, but pt must be able to walk. Shivani plans to talk with a  and have her come and assess pt tomorrow. In house rehab to continue until pt can return. Anjali Sidhu/AIXA

## 2024-03-07 NOTE — PLAN OF CARE
Problem: Discharge Planning  Goal: Discharge to home or other facility with appropriate resources  3/6/2024 1244 by Miya Pozo RN  Outcome: Progressing  3/6/2024 1237 by Miya Pozo RN  Outcome: Progressing     Problem: Safety - Adult  Goal: Free from fall injury  3/7/2024 0044 by Queenie Rubin RN  Outcome: Progressing  3/6/2024 1244 by Miya Pozo RN  Outcome: Progressing  3/6/2024 1237 by Miya Pozo RN  Outcome: Progressing     Problem: Skin/Tissue Integrity  Goal: Absence of new skin breakdown  Description: 1.  Monitor for areas of redness and/or skin breakdown  2.  Assess vascular access sites hourly  3.  Every 4-6 hours minimum:  Change oxygen saturation probe site  4.  Every 4-6 hours:  If on nasal continuous positive airway pressure, respiratory therapy assess nares and determine need for appliance change or resting period.  3/7/2024 0044 by Queenie Rubin RN  Outcome: Progressing  3/6/2024 1244 by Miya Pozo RN  Outcome: Progressing  3/6/2024 1237 by Miya Pozo RN  Outcome: Progressing     Problem: ABCDS Injury Assessment  Goal: Absence of physical injury  3/7/2024 0044 by Queenie Rubin RN  Outcome: Progressing  3/6/2024 1244 by Miya Pozo RN  Outcome: Progressing  3/6/2024 1237 by Miya Pozo RN  Outcome: Progressing

## 2024-03-08 ENCOUNTER — APPOINTMENT (OUTPATIENT)
Dept: CT IMAGING | Age: 54
End: 2024-03-08
Payer: MEDICAID

## 2024-03-08 LAB
GLUCOSE BLD STRIP.AUTO-MCNC: 110 MG/DL (ref 65–100)
GLUCOSE BLD STRIP.AUTO-MCNC: 133 MG/DL (ref 65–100)
GLUCOSE BLD STRIP.AUTO-MCNC: 143 MG/DL (ref 65–100)
GLUCOSE BLD STRIP.AUTO-MCNC: 151 MG/DL (ref 65–100)
GLUCOSE BLD STRIP.AUTO-MCNC: 195 MG/DL (ref 65–100)
SERVICE CMNT-IMP: ABNORMAL

## 2024-03-08 PROCEDURE — 6370000000 HC RX 637 (ALT 250 FOR IP): Performed by: FAMILY MEDICINE

## 2024-03-08 PROCEDURE — 6360000002 HC RX W HCPCS: Performed by: STUDENT IN AN ORGANIZED HEALTH CARE EDUCATION/TRAINING PROGRAM

## 2024-03-08 PROCEDURE — 96372 THER/PROPH/DIAG INJ SC/IM: CPT

## 2024-03-08 PROCEDURE — 6370000000 HC RX 637 (ALT 250 FOR IP): Performed by: STUDENT IN AN ORGANIZED HEALTH CARE EDUCATION/TRAINING PROGRAM

## 2024-03-08 PROCEDURE — 97530 THERAPEUTIC ACTIVITIES: CPT

## 2024-03-08 PROCEDURE — G0378 HOSPITAL OBSERVATION PER HR: HCPCS

## 2024-03-08 PROCEDURE — 82962 GLUCOSE BLOOD TEST: CPT

## 2024-03-08 PROCEDURE — 70450 CT HEAD/BRAIN W/O DYE: CPT

## 2024-03-08 RX ORDER — CLONAZEPAM 1 MG/1
1 TABLET ORAL 2 TIMES DAILY
Status: DISCONTINUED | OUTPATIENT
Start: 2024-03-08 | End: 2024-03-13

## 2024-03-08 RX ORDER — CLONAZEPAM 0.5 MG/1
0.5 TABLET ORAL 2 TIMES DAILY
Status: DISCONTINUED | OUTPATIENT
Start: 2024-03-08 | End: 2024-03-08

## 2024-03-08 RX ADMIN — DIVALPROEX SODIUM 750 MG: 250 TABLET, DELAYED RELEASE ORAL at 08:19

## 2024-03-08 RX ADMIN — DIVALPROEX SODIUM 750 MG: 250 TABLET, DELAYED RELEASE ORAL at 22:37

## 2024-03-08 RX ADMIN — METOPROLOL SUCCINATE 25 MG: 25 TABLET, EXTENDED RELEASE ORAL at 08:19

## 2024-03-08 RX ADMIN — QUETIAPINE FUMARATE 400 MG: 100 TABLET ORAL at 08:19

## 2024-03-08 RX ADMIN — ENOXAPARIN SODIUM 40 MG: 100 INJECTION SUBCUTANEOUS at 08:20

## 2024-03-08 RX ADMIN — CLONAZEPAM 1 MG: 1 TABLET ORAL at 22:38

## 2024-03-08 RX ADMIN — FAMOTIDINE 20 MG: 20 TABLET, FILM COATED ORAL at 08:19

## 2024-03-08 RX ADMIN — AMANTADINE HYDROCHLORIDE 100 MG: 100 CAPSULE, LIQUID FILLED ORAL at 22:37

## 2024-03-08 RX ADMIN — ROSUVASTATIN CALCIUM 10 MG: 5 TABLET, COATED ORAL at 22:38

## 2024-03-08 RX ADMIN — FAMOTIDINE 20 MG: 20 TABLET, FILM COATED ORAL at 22:38

## 2024-03-08 RX ADMIN — PANTOPRAZOLE SODIUM 40 MG: 40 TABLET, DELAYED RELEASE ORAL at 06:30

## 2024-03-08 RX ADMIN — RISPERIDONE 0.5 MG: 0.5 TABLET ORAL at 08:19

## 2024-03-08 RX ADMIN — INSULIN GLARGINE 10 UNITS: 100 INJECTION, SOLUTION SUBCUTANEOUS at 08:20

## 2024-03-08 RX ADMIN — CLONAZEPAM 1 MG: 1 TABLET ORAL at 08:19

## 2024-03-08 RX ADMIN — AMANTADINE HYDROCHLORIDE 100 MG: 100 CAPSULE, LIQUID FILLED ORAL at 08:19

## 2024-03-08 NOTE — CARE COORDINATION
Group home director assessed patient at bedside. She states patient was independent with ADLs prior to admission. Today, patient is very drowsy. Patient will need to be able to feed self and ambulate prior to returning to group home setting. CM will follow.

## 2024-03-09 LAB
ANION GAP SERPL CALC-SCNC: 5 MMOL/L (ref 2–11)
BASOPHILS # BLD: 0.1 K/UL (ref 0–0.2)
BASOPHILS NFR BLD: 1 % (ref 0–2)
BUN SERPL-MCNC: 17 MG/DL (ref 6–23)
CALCIUM SERPL-MCNC: 10 MG/DL (ref 8.3–10.4)
CHLORIDE SERPL-SCNC: 111 MMOL/L (ref 103–113)
CO2 SERPL-SCNC: 28 MMOL/L (ref 21–32)
CREAT SERPL-MCNC: 0.8 MG/DL (ref 0.6–1)
DIFFERENTIAL METHOD BLD: ABNORMAL
EOSINOPHIL # BLD: 0 K/UL (ref 0–0.8)
EOSINOPHIL NFR BLD: 0 % (ref 0.5–7.8)
ERYTHROCYTE [DISTWIDTH] IN BLOOD BY AUTOMATED COUNT: 13.4 % (ref 11.9–14.6)
GLUCOSE BLD STRIP.AUTO-MCNC: 160 MG/DL (ref 65–100)
GLUCOSE BLD STRIP.AUTO-MCNC: 166 MG/DL (ref 65–100)
GLUCOSE BLD STRIP.AUTO-MCNC: 170 MG/DL (ref 65–100)
GLUCOSE BLD STRIP.AUTO-MCNC: 194 MG/DL (ref 65–100)
GLUCOSE SERPL-MCNC: 126 MG/DL (ref 65–100)
HCT VFR BLD AUTO: 50.9 % (ref 35.8–46.3)
HGB BLD-MCNC: 16 G/DL (ref 11.7–15.4)
IMM GRANULOCYTES # BLD AUTO: 0.1 K/UL (ref 0–0.5)
IMM GRANULOCYTES NFR BLD AUTO: 1 % (ref 0–5)
LYMPHOCYTES # BLD: 3.3 K/UL (ref 0.5–4.6)
LYMPHOCYTES NFR BLD: 46 % (ref 13–44)
MCH RBC QN AUTO: 31.9 PG (ref 26.1–32.9)
MCHC RBC AUTO-ENTMCNC: 31.4 G/DL (ref 31.4–35)
MCV RBC AUTO: 101.6 FL (ref 82–102)
MONOCYTES # BLD: 0.7 K/UL (ref 0.1–1.3)
MONOCYTES NFR BLD: 9 % (ref 4–12)
NEUTS SEG # BLD: 3 K/UL (ref 1.7–8.2)
NEUTS SEG NFR BLD: 43 % (ref 43–78)
NRBC # BLD: 0.03 K/UL (ref 0–0.2)
PLATELET # BLD AUTO: 198 K/UL (ref 150–450)
PMV BLD AUTO: 9.4 FL (ref 9.4–12.3)
POTASSIUM SERPL-SCNC: 4.4 MMOL/L (ref 3.5–5.1)
RBC # BLD AUTO: 5.01 M/UL (ref 4.05–5.2)
SERVICE CMNT-IMP: ABNORMAL
SODIUM SERPL-SCNC: 144 MMOL/L (ref 136–146)
WBC # BLD AUTO: 7.1 K/UL (ref 4.3–11.1)

## 2024-03-09 PROCEDURE — 6370000000 HC RX 637 (ALT 250 FOR IP): Performed by: FAMILY MEDICINE

## 2024-03-09 PROCEDURE — 80048 BASIC METABOLIC PNL TOTAL CA: CPT

## 2024-03-09 PROCEDURE — 6370000000 HC RX 637 (ALT 250 FOR IP): Performed by: STUDENT IN AN ORGANIZED HEALTH CARE EDUCATION/TRAINING PROGRAM

## 2024-03-09 PROCEDURE — 82962 GLUCOSE BLOOD TEST: CPT

## 2024-03-09 PROCEDURE — 85025 COMPLETE CBC W/AUTO DIFF WBC: CPT

## 2024-03-09 PROCEDURE — G0378 HOSPITAL OBSERVATION PER HR: HCPCS

## 2024-03-09 PROCEDURE — 96372 THER/PROPH/DIAG INJ SC/IM: CPT

## 2024-03-09 PROCEDURE — 36415 COLL VENOUS BLD VENIPUNCTURE: CPT

## 2024-03-09 PROCEDURE — 97530 THERAPEUTIC ACTIVITIES: CPT

## 2024-03-09 PROCEDURE — 6360000002 HC RX W HCPCS: Performed by: STUDENT IN AN ORGANIZED HEALTH CARE EDUCATION/TRAINING PROGRAM

## 2024-03-09 RX ADMIN — AMANTADINE HYDROCHLORIDE 100 MG: 100 CAPSULE, LIQUID FILLED ORAL at 22:11

## 2024-03-09 RX ADMIN — INSULIN GLARGINE 10 UNITS: 100 INJECTION, SOLUTION SUBCUTANEOUS at 09:18

## 2024-03-09 RX ADMIN — ROSUVASTATIN CALCIUM 10 MG: 5 TABLET, COATED ORAL at 22:11

## 2024-03-09 RX ADMIN — DIVALPROEX SODIUM 750 MG: 250 TABLET, DELAYED RELEASE ORAL at 09:17

## 2024-03-09 RX ADMIN — AMANTADINE HYDROCHLORIDE 100 MG: 100 CAPSULE, LIQUID FILLED ORAL at 09:17

## 2024-03-09 RX ADMIN — ENOXAPARIN SODIUM 40 MG: 100 INJECTION SUBCUTANEOUS at 09:17

## 2024-03-09 RX ADMIN — OXYCODONE 5 MG: 5 TABLET ORAL at 22:24

## 2024-03-09 RX ADMIN — FAMOTIDINE 20 MG: 20 TABLET, FILM COATED ORAL at 09:17

## 2024-03-09 RX ADMIN — CLONAZEPAM 1 MG: 1 TABLET ORAL at 09:17

## 2024-03-09 RX ADMIN — DIVALPROEX SODIUM 750 MG: 250 TABLET, DELAYED RELEASE ORAL at 22:11

## 2024-03-09 RX ADMIN — METOPROLOL SUCCINATE 25 MG: 25 TABLET, EXTENDED RELEASE ORAL at 09:17

## 2024-03-09 RX ADMIN — QUETIAPINE FUMARATE 400 MG: 100 TABLET ORAL at 22:11

## 2024-03-09 RX ADMIN — PANTOPRAZOLE SODIUM 40 MG: 40 TABLET, DELAYED RELEASE ORAL at 06:02

## 2024-03-09 RX ADMIN — CLONAZEPAM 1 MG: 1 TABLET ORAL at 22:11

## 2024-03-09 RX ADMIN — FAMOTIDINE 20 MG: 20 TABLET, FILM COATED ORAL at 22:11

## 2024-03-09 ASSESSMENT — PAIN SCALES - GENERAL: PAINLEVEL_OUTOF10: 5

## 2024-03-10 LAB
GLUCOSE BLD STRIP.AUTO-MCNC: 115 MG/DL (ref 65–100)
GLUCOSE BLD STRIP.AUTO-MCNC: 120 MG/DL (ref 65–100)
GLUCOSE BLD STRIP.AUTO-MCNC: 147 MG/DL (ref 65–100)
GLUCOSE BLD STRIP.AUTO-MCNC: 201 MG/DL (ref 65–100)
SERVICE CMNT-IMP: ABNORMAL

## 2024-03-10 PROCEDURE — G0378 HOSPITAL OBSERVATION PER HR: HCPCS

## 2024-03-10 PROCEDURE — 6370000000 HC RX 637 (ALT 250 FOR IP): Performed by: FAMILY MEDICINE

## 2024-03-10 PROCEDURE — 82962 GLUCOSE BLOOD TEST: CPT

## 2024-03-10 PROCEDURE — 6370000000 HC RX 637 (ALT 250 FOR IP): Performed by: STUDENT IN AN ORGANIZED HEALTH CARE EDUCATION/TRAINING PROGRAM

## 2024-03-10 PROCEDURE — 96372 THER/PROPH/DIAG INJ SC/IM: CPT

## 2024-03-10 PROCEDURE — 6360000002 HC RX W HCPCS: Performed by: STUDENT IN AN ORGANIZED HEALTH CARE EDUCATION/TRAINING PROGRAM

## 2024-03-10 RX ADMIN — DIVALPROEX SODIUM 750 MG: 250 TABLET, DELAYED RELEASE ORAL at 21:57

## 2024-03-10 RX ADMIN — CLONAZEPAM 1 MG: 1 TABLET ORAL at 21:58

## 2024-03-10 RX ADMIN — AMANTADINE HYDROCHLORIDE 100 MG: 100 CAPSULE, LIQUID FILLED ORAL at 21:57

## 2024-03-10 RX ADMIN — ENOXAPARIN SODIUM 40 MG: 100 INJECTION SUBCUTANEOUS at 09:17

## 2024-03-10 RX ADMIN — FAMOTIDINE 20 MG: 20 TABLET, FILM COATED ORAL at 21:57

## 2024-03-10 RX ADMIN — FAMOTIDINE 20 MG: 20 TABLET, FILM COATED ORAL at 09:06

## 2024-03-10 RX ADMIN — DIVALPROEX SODIUM 750 MG: 250 TABLET, DELAYED RELEASE ORAL at 09:05

## 2024-03-10 RX ADMIN — AMANTADINE HYDROCHLORIDE 100 MG: 100 CAPSULE, LIQUID FILLED ORAL at 09:06

## 2024-03-10 RX ADMIN — QUETIAPINE FUMARATE 400 MG: 100 TABLET ORAL at 09:05

## 2024-03-10 RX ADMIN — INSULIN GLARGINE 10 UNITS: 100 INJECTION, SOLUTION SUBCUTANEOUS at 09:20

## 2024-03-10 RX ADMIN — ROSUVASTATIN CALCIUM 10 MG: 5 TABLET, COATED ORAL at 21:58

## 2024-03-10 RX ADMIN — CLONAZEPAM 1 MG: 1 TABLET ORAL at 09:06

## 2024-03-10 RX ADMIN — QUETIAPINE FUMARATE 400 MG: 100 TABLET ORAL at 21:56

## 2024-03-10 RX ADMIN — INSULIN LISPRO 2 UNITS: 100 INJECTION, SOLUTION INTRAVENOUS; SUBCUTANEOUS at 12:16

## 2024-03-10 RX ADMIN — METOPROLOL SUCCINATE 25 MG: 25 TABLET, EXTENDED RELEASE ORAL at 09:06

## 2024-03-11 LAB
GLUCOSE BLD STRIP.AUTO-MCNC: 131 MG/DL (ref 65–100)
GLUCOSE BLD STRIP.AUTO-MCNC: 146 MG/DL (ref 65–100)
GLUCOSE BLD STRIP.AUTO-MCNC: 196 MG/DL (ref 65–100)
GLUCOSE BLD STRIP.AUTO-MCNC: 208 MG/DL (ref 65–100)
SERVICE CMNT-IMP: ABNORMAL

## 2024-03-11 PROCEDURE — 6370000000 HC RX 637 (ALT 250 FOR IP): Performed by: FAMILY MEDICINE

## 2024-03-11 PROCEDURE — 82962 GLUCOSE BLOOD TEST: CPT

## 2024-03-11 PROCEDURE — 6370000000 HC RX 637 (ALT 250 FOR IP): Performed by: STUDENT IN AN ORGANIZED HEALTH CARE EDUCATION/TRAINING PROGRAM

## 2024-03-11 PROCEDURE — G0378 HOSPITAL OBSERVATION PER HR: HCPCS

## 2024-03-11 PROCEDURE — 6360000002 HC RX W HCPCS: Performed by: STUDENT IN AN ORGANIZED HEALTH CARE EDUCATION/TRAINING PROGRAM

## 2024-03-11 PROCEDURE — 97530 THERAPEUTIC ACTIVITIES: CPT

## 2024-03-11 PROCEDURE — 96372 THER/PROPH/DIAG INJ SC/IM: CPT

## 2024-03-11 RX ORDER — QUETIAPINE FUMARATE 100 MG/1
300 TABLET, FILM COATED ORAL 2 TIMES DAILY
Status: DISCONTINUED | OUTPATIENT
Start: 2024-03-11 | End: 2024-03-12

## 2024-03-11 RX ADMIN — DIVALPROEX SODIUM 750 MG: 250 TABLET, DELAYED RELEASE ORAL at 21:03

## 2024-03-11 RX ADMIN — ROSUVASTATIN CALCIUM 10 MG: 5 TABLET, COATED ORAL at 21:03

## 2024-03-11 RX ADMIN — DIVALPROEX SODIUM 750 MG: 250 TABLET, DELAYED RELEASE ORAL at 08:42

## 2024-03-11 RX ADMIN — CLONAZEPAM 1 MG: 1 TABLET ORAL at 21:03

## 2024-03-11 RX ADMIN — AMANTADINE HYDROCHLORIDE 100 MG: 100 CAPSULE, LIQUID FILLED ORAL at 08:42

## 2024-03-11 RX ADMIN — ENOXAPARIN SODIUM 40 MG: 100 INJECTION SUBCUTANEOUS at 08:39

## 2024-03-11 RX ADMIN — AMANTADINE HYDROCHLORIDE 100 MG: 100 CAPSULE, LIQUID FILLED ORAL at 21:03

## 2024-03-11 RX ADMIN — INSULIN GLARGINE 10 UNITS: 100 INJECTION, SOLUTION SUBCUTANEOUS at 08:39

## 2024-03-11 RX ADMIN — FAMOTIDINE 20 MG: 20 TABLET, FILM COATED ORAL at 21:03

## 2024-03-11 RX ADMIN — PANTOPRAZOLE SODIUM 40 MG: 40 TABLET, DELAYED RELEASE ORAL at 06:04

## 2024-03-11 RX ADMIN — QUETIAPINE FUMARATE 400 MG: 100 TABLET ORAL at 08:42

## 2024-03-11 RX ADMIN — CLONAZEPAM 1 MG: 1 TABLET ORAL at 08:42

## 2024-03-11 RX ADMIN — METOPROLOL SUCCINATE 25 MG: 25 TABLET, EXTENDED RELEASE ORAL at 08:42

## 2024-03-11 RX ADMIN — FAMOTIDINE 20 MG: 20 TABLET, FILM COATED ORAL at 08:42

## 2024-03-11 NOTE — CARE COORDINATION
Spoke with pt and her brother Duc ([phone]) who was at bedside. He was requesting an update.  I explained that she is still too weak to return to her group home. He agreed that she is weaker than normal. Pt discussed in IDT rounds.  Pt showing improvement but not consistently.  MD adjusting psych meds.  CM following and will assist with d/c when appropriate.

## 2024-03-12 LAB
GLUCOSE BLD STRIP.AUTO-MCNC: 120 MG/DL (ref 65–100)
GLUCOSE BLD STRIP.AUTO-MCNC: 221 MG/DL (ref 65–100)
GLUCOSE BLD STRIP.AUTO-MCNC: 223 MG/DL (ref 65–100)
GLUCOSE BLD STRIP.AUTO-MCNC: 226 MG/DL (ref 65–100)
SERVICE CMNT-IMP: ABNORMAL

## 2024-03-12 PROCEDURE — 97535 SELF CARE MNGMENT TRAINING: CPT

## 2024-03-12 PROCEDURE — 6370000000 HC RX 637 (ALT 250 FOR IP): Performed by: STUDENT IN AN ORGANIZED HEALTH CARE EDUCATION/TRAINING PROGRAM

## 2024-03-12 PROCEDURE — 6370000000 HC RX 637 (ALT 250 FOR IP): Performed by: FAMILY MEDICINE

## 2024-03-12 PROCEDURE — 97530 THERAPEUTIC ACTIVITIES: CPT

## 2024-03-12 PROCEDURE — G0378 HOSPITAL OBSERVATION PER HR: HCPCS

## 2024-03-12 PROCEDURE — 6360000002 HC RX W HCPCS: Performed by: STUDENT IN AN ORGANIZED HEALTH CARE EDUCATION/TRAINING PROGRAM

## 2024-03-12 PROCEDURE — 82962 GLUCOSE BLOOD TEST: CPT

## 2024-03-12 PROCEDURE — 96372 THER/PROPH/DIAG INJ SC/IM: CPT

## 2024-03-12 RX ORDER — QUETIAPINE FUMARATE 100 MG/1
200 TABLET, FILM COATED ORAL 2 TIMES DAILY
Status: DISCONTINUED | OUTPATIENT
Start: 2024-03-12 | End: 2024-03-13

## 2024-03-12 RX ORDER — OXYCODONE HYDROCHLORIDE 5 MG/1
5 CAPSULE ORAL EVERY 6 HOURS PRN
Status: DISCONTINUED | OUTPATIENT
Start: 2024-03-12 | End: 2024-03-19 | Stop reason: HOSPADM

## 2024-03-12 RX ADMIN — AMANTADINE HYDROCHLORIDE 100 MG: 100 CAPSULE, LIQUID FILLED ORAL at 21:38

## 2024-03-12 RX ADMIN — PANTOPRAZOLE SODIUM 40 MG: 40 TABLET, DELAYED RELEASE ORAL at 05:47

## 2024-03-12 RX ADMIN — QUETIAPINE FUMARATE 200 MG: 100 TABLET ORAL at 21:38

## 2024-03-12 RX ADMIN — ROSUVASTATIN CALCIUM 10 MG: 5 TABLET, COATED ORAL at 21:38

## 2024-03-12 RX ADMIN — INSULIN LISPRO 2 UNITS: 100 INJECTION, SOLUTION INTRAVENOUS; SUBCUTANEOUS at 12:20

## 2024-03-12 RX ADMIN — ENOXAPARIN SODIUM 40 MG: 100 INJECTION SUBCUTANEOUS at 09:06

## 2024-03-12 RX ADMIN — INSULIN GLARGINE 10 UNITS: 100 INJECTION, SOLUTION SUBCUTANEOUS at 09:05

## 2024-03-12 RX ADMIN — INSULIN LISPRO 2 UNITS: 100 INJECTION, SOLUTION INTRAVENOUS; SUBCUTANEOUS at 17:10

## 2024-03-12 RX ADMIN — CLONAZEPAM 1 MG: 1 TABLET ORAL at 21:37

## 2024-03-12 RX ADMIN — FAMOTIDINE 20 MG: 20 TABLET, FILM COATED ORAL at 09:05

## 2024-03-12 RX ADMIN — CLONAZEPAM 1 MG: 1 TABLET ORAL at 09:05

## 2024-03-12 RX ADMIN — METOPROLOL SUCCINATE 25 MG: 25 TABLET, EXTENDED RELEASE ORAL at 09:07

## 2024-03-12 RX ADMIN — DIVALPROEX SODIUM 750 MG: 250 TABLET, DELAYED RELEASE ORAL at 21:38

## 2024-03-12 RX ADMIN — FAMOTIDINE 20 MG: 20 TABLET, FILM COATED ORAL at 21:38

## 2024-03-12 RX ADMIN — DIVALPROEX SODIUM 750 MG: 250 TABLET, DELAYED RELEASE ORAL at 09:05

## 2024-03-12 RX ADMIN — AMANTADINE HYDROCHLORIDE 100 MG: 100 CAPSULE, LIQUID FILLED ORAL at 09:05

## 2024-03-12 NOTE — CARE COORDINATION
I reviewed patients old SW notes from [facility].  Psych dx showing history of schizoaffective disorder and suicide attempts.No recent hospitalizations for behavior issues noted.  I met with patient and physician today in her room. Patient sitting up in chair and smiling as we entered.  Patient more alert today and able to feed self now. Patient is still not ambulatory which is required to return to her Group Home. ([facility])  PT documenting progress made with 4 steps taken but is a two person transfer. Will continue to follow and discuss what is the process of having her moved her to another group home within the [facility] that can offer more physical help if needed. Local [facility] is [phone] per website.

## 2024-03-13 PROBLEM — R41.82 ALTERED MENTAL STATUS: Status: ACTIVE | Noted: 2024-03-13

## 2024-03-13 PROBLEM — F25.0 SCHIZOAFFECTIVE DISORDER, BIPOLAR TYPE (HCC): Status: ACTIVE | Noted: 2024-03-13

## 2024-03-13 LAB
ALBUMIN SERPL-MCNC: 2.7 G/DL (ref 3.5–5)
ALBUMIN/GLOB SERPL: 0.6 (ref 0.4–1.6)
ALP SERPL-CCNC: 71 U/L (ref 50–136)
ALT SERPL-CCNC: 19 U/L (ref 12–65)
ANION GAP SERPL CALC-SCNC: 7 MMOL/L (ref 2–11)
AST SERPL-CCNC: 23 U/L (ref 15–37)
BILIRUB SERPL-MCNC: 0.2 MG/DL (ref 0.2–1.1)
BUN SERPL-MCNC: 17 MG/DL (ref 6–23)
CALCIUM SERPL-MCNC: 9.6 MG/DL (ref 8.3–10.4)
CHLORIDE SERPL-SCNC: 109 MMOL/L (ref 103–113)
CO2 SERPL-SCNC: 27 MMOL/L (ref 21–32)
CREAT SERPL-MCNC: 0.62 MG/DL (ref 0.6–1)
ERYTHROCYTE [DISTWIDTH] IN BLOOD BY AUTOMATED COUNT: 13.2 % (ref 11.9–14.6)
GLOBULIN SER CALC-MCNC: 4.5 G/DL (ref 2.8–4.5)
GLUCOSE BLD STRIP.AUTO-MCNC: 185 MG/DL (ref 65–100)
GLUCOSE BLD STRIP.AUTO-MCNC: 212 MG/DL (ref 65–100)
GLUCOSE BLD STRIP.AUTO-MCNC: 226 MG/DL (ref 65–100)
GLUCOSE BLD STRIP.AUTO-MCNC: 257 MG/DL (ref 65–100)
GLUCOSE SERPL-MCNC: 201 MG/DL (ref 65–100)
HCT VFR BLD AUTO: 42.8 % (ref 35.8–46.3)
HGB BLD-MCNC: 13.7 G/DL (ref 11.7–15.4)
MCH RBC QN AUTO: 32.1 PG (ref 26.1–32.9)
MCHC RBC AUTO-ENTMCNC: 32 G/DL (ref 31.4–35)
MCV RBC AUTO: 100.2 FL (ref 82–102)
NRBC # BLD: 0.02 K/UL (ref 0–0.2)
PLATELET # BLD AUTO: 215 K/UL (ref 150–450)
PMV BLD AUTO: 10 FL (ref 9.4–12.3)
POTASSIUM SERPL-SCNC: 4.1 MMOL/L (ref 3.5–5.1)
PROT SERPL-MCNC: 7.2 G/DL (ref 6.3–8.2)
RBC # BLD AUTO: 4.27 M/UL (ref 4.05–5.2)
SERVICE CMNT-IMP: ABNORMAL
SODIUM SERPL-SCNC: 143 MMOL/L (ref 136–146)
WBC # BLD AUTO: 8 K/UL (ref 4.3–11.1)

## 2024-03-13 PROCEDURE — 96372 THER/PROPH/DIAG INJ SC/IM: CPT

## 2024-03-13 PROCEDURE — G0378 HOSPITAL OBSERVATION PER HR: HCPCS

## 2024-03-13 PROCEDURE — 6370000000 HC RX 637 (ALT 250 FOR IP): Performed by: STUDENT IN AN ORGANIZED HEALTH CARE EDUCATION/TRAINING PROGRAM

## 2024-03-13 PROCEDURE — 6370000000 HC RX 637 (ALT 250 FOR IP): Performed by: INTERNAL MEDICINE

## 2024-03-13 PROCEDURE — 36415 COLL VENOUS BLD VENIPUNCTURE: CPT

## 2024-03-13 PROCEDURE — 6360000002 HC RX W HCPCS: Performed by: STUDENT IN AN ORGANIZED HEALTH CARE EDUCATION/TRAINING PROGRAM

## 2024-03-13 PROCEDURE — 97530 THERAPEUTIC ACTIVITIES: CPT

## 2024-03-13 PROCEDURE — 85027 COMPLETE CBC AUTOMATED: CPT

## 2024-03-13 PROCEDURE — 90792 PSYCH DIAG EVAL W/MED SRVCS: CPT

## 2024-03-13 PROCEDURE — 6370000000 HC RX 637 (ALT 250 FOR IP): Performed by: FAMILY MEDICINE

## 2024-03-13 PROCEDURE — 80053 COMPREHEN METABOLIC PANEL: CPT

## 2024-03-13 PROCEDURE — 82962 GLUCOSE BLOOD TEST: CPT

## 2024-03-13 RX ORDER — QUETIAPINE FUMARATE 100 MG/1
200 TABLET, FILM COATED ORAL NIGHTLY
Status: DISCONTINUED | OUTPATIENT
Start: 2024-03-14 | End: 2024-03-19 | Stop reason: HOSPADM

## 2024-03-13 RX ORDER — CLONAZEPAM 1 MG/1
1 TABLET ORAL NIGHTLY
Status: DISCONTINUED | OUTPATIENT
Start: 2024-03-14 | End: 2024-03-19 | Stop reason: HOSPADM

## 2024-03-13 RX ORDER — DIVALPROEX SODIUM 500 MG/1
500 TABLET, EXTENDED RELEASE ORAL NIGHTLY
Status: DISCONTINUED | OUTPATIENT
Start: 2024-03-13 | End: 2024-03-19 | Stop reason: HOSPADM

## 2024-03-13 RX ADMIN — PANTOPRAZOLE SODIUM 40 MG: 40 TABLET, DELAYED RELEASE ORAL at 05:50

## 2024-03-13 RX ADMIN — FAMOTIDINE 20 MG: 20 TABLET, FILM COATED ORAL at 09:03

## 2024-03-13 RX ADMIN — AMANTADINE HYDROCHLORIDE 100 MG: 100 CAPSULE, LIQUID FILLED ORAL at 21:17

## 2024-03-13 RX ADMIN — ROSUVASTATIN CALCIUM 10 MG: 5 TABLET, COATED ORAL at 21:17

## 2024-03-13 RX ADMIN — ENOXAPARIN SODIUM 40 MG: 100 INJECTION SUBCUTANEOUS at 09:03

## 2024-03-13 RX ADMIN — AMANTADINE HYDROCHLORIDE 100 MG: 100 CAPSULE, LIQUID FILLED ORAL at 09:03

## 2024-03-13 RX ADMIN — FAMOTIDINE 20 MG: 20 TABLET, FILM COATED ORAL at 21:17

## 2024-03-13 RX ADMIN — INSULIN GLARGINE 10 UNITS: 100 INJECTION, SOLUTION SUBCUTANEOUS at 09:03

## 2024-03-13 RX ADMIN — INSULIN LISPRO 2 UNITS: 100 INJECTION, SOLUTION INTRAVENOUS; SUBCUTANEOUS at 12:27

## 2024-03-13 RX ADMIN — DIVALPROEX SODIUM 750 MG: 250 TABLET, DELAYED RELEASE ORAL at 09:03

## 2024-03-13 RX ADMIN — DIVALPROEX SODIUM 500 MG: 500 TABLET, EXTENDED RELEASE ORAL at 21:17

## 2024-03-13 RX ADMIN — METOPROLOL SUCCINATE 25 MG: 25 TABLET, EXTENDED RELEASE ORAL at 09:03

## 2024-03-13 RX ADMIN — INSULIN LISPRO 4 UNITS: 100 INJECTION, SOLUTION INTRAVENOUS; SUBCUTANEOUS at 16:58

## 2024-03-13 RX ADMIN — CLONAZEPAM 1 MG: 1 TABLET ORAL at 09:03

## 2024-03-13 RX ADMIN — QUETIAPINE FUMARATE 200 MG: 100 TABLET ORAL at 09:03

## 2024-03-13 NOTE — CONSULTS
SpO2: 95%       Labs:   Recent Results (from the past 24 hour(s))   POCT Glucose    Collection Time: 03/12/24  4:17 PM   Result Value Ref Range    POC Glucose 221 (H) 65 - 100 mg/dL    Performed by: Moira    POCT Glucose    Collection Time: 03/12/24  9:04 PM   Result Value Ref Range    POC Glucose 226 (H) 65 - 100 mg/dL    Performed by: Cesar    CBC    Collection Time: 03/13/24  3:27 AM   Result Value Ref Range    WBC 8.0 4.3 - 11.1 K/uL    RBC 4.27 4.05 - 5.2 M/uL    Hemoglobin 13.7 11.7 - 15.4 g/dL    Hematocrit 42.8 35.8 - 46.3 %    .2 82.0 - 102.0 FL    MCH 32.1 26.1 - 32.9 PG    MCHC 32.0 31.4 - 35.0 g/dL    RDW 13.2 11.9 - 14.6 %    Platelets 215 150 - 450 K/uL    MPV 10.0 9.4 - 12.3 FL    nRBC 0.02 0.0 - 0.2 K/uL   Comprehensive Metabolic Panel    Collection Time: 03/13/24  3:27 AM   Result Value Ref Range    Sodium 143 136 - 146 mmol/L    Potassium 4.1 3.5 - 5.1 mmol/L    Chloride 109 103 - 113 mmol/L    CO2 27 21 - 32 mmol/L    Anion Gap 7 2 - 11 mmol/L    Glucose 201 (H) 65 - 100 mg/dL    BUN 17 6 - 23 MG/DL    Creatinine 0.62 0.6 - 1.0 MG/DL    Est, Glom Filt Rate >60 >60 ml/min/1.73m2    Calcium 9.6 8.3 - 10.4 MG/DL    Total Bilirubin 0.2 0.2 - 1.1 MG/DL    ALT 19 12 - 65 U/L    AST 23 15 - 37 U/L    Alk Phosphatase 71 50 - 136 U/L    Total Protein 7.2 6.3 - 8.2 g/dL    Albumin 2.7 (L) 3.5 - 5.0 g/dL    Globulin 4.5 2.8 - 4.5 g/dL    Albumin/Globulin Ratio 0.6 0.4 - 1.6     POCT Glucose    Collection Time: 03/13/24  7:22 AM   Result Value Ref Range    POC Glucose 185 (H) 65 - 100 mg/dL    Performed by: Veronica    POCT Glucose    Collection Time: 03/13/24 11:18 AM   Result Value Ref Range    POC Glucose 212 (H) 65 - 100 mg/dL    Performed by: Veronica        Medical Review Of Systems:    Psychiatric: as above and below.    Psychological ROS: positive for - behavioral disorder  negative for - depression, hostility, or suicidal ideation    Mental Status

## 2024-03-14 LAB
GLUCOSE BLD STRIP.AUTO-MCNC: 176 MG/DL (ref 65–100)
GLUCOSE BLD STRIP.AUTO-MCNC: 303 MG/DL (ref 65–100)
GLUCOSE BLD STRIP.AUTO-MCNC: 366 MG/DL (ref 65–100)
GLUCOSE BLD STRIP.AUTO-MCNC: 373 MG/DL (ref 65–100)
SERVICE CMNT-IMP: ABNORMAL
VALPROATE SERPL-MCNC: 91 UG/ML (ref 50–100)

## 2024-03-14 PROCEDURE — 97530 THERAPEUTIC ACTIVITIES: CPT

## 2024-03-14 PROCEDURE — 97535 SELF CARE MNGMENT TRAINING: CPT

## 2024-03-14 PROCEDURE — G0378 HOSPITAL OBSERVATION PER HR: HCPCS

## 2024-03-14 PROCEDURE — 96372 THER/PROPH/DIAG INJ SC/IM: CPT

## 2024-03-14 PROCEDURE — 6370000000 HC RX 637 (ALT 250 FOR IP): Performed by: STUDENT IN AN ORGANIZED HEALTH CARE EDUCATION/TRAINING PROGRAM

## 2024-03-14 PROCEDURE — 80164 ASSAY DIPROPYLACETIC ACD TOT: CPT

## 2024-03-14 PROCEDURE — 36415 COLL VENOUS BLD VENIPUNCTURE: CPT

## 2024-03-14 PROCEDURE — 6360000002 HC RX W HCPCS: Performed by: STUDENT IN AN ORGANIZED HEALTH CARE EDUCATION/TRAINING PROGRAM

## 2024-03-14 PROCEDURE — 82962 GLUCOSE BLOOD TEST: CPT

## 2024-03-14 PROCEDURE — 6370000000 HC RX 637 (ALT 250 FOR IP): Performed by: INTERNAL MEDICINE

## 2024-03-14 RX ORDER — INSULIN GLARGINE 100 [IU]/ML
14 INJECTION, SOLUTION SUBCUTANEOUS DAILY
Status: DISCONTINUED | OUTPATIENT
Start: 2024-03-15 | End: 2024-03-16

## 2024-03-14 RX ADMIN — METOPROLOL SUCCINATE 25 MG: 25 TABLET, EXTENDED RELEASE ORAL at 08:21

## 2024-03-14 RX ADMIN — AMANTADINE HYDROCHLORIDE 100 MG: 100 CAPSULE, LIQUID FILLED ORAL at 08:21

## 2024-03-14 RX ADMIN — AMANTADINE HYDROCHLORIDE 100 MG: 100 CAPSULE, LIQUID FILLED ORAL at 21:29

## 2024-03-14 RX ADMIN — FAMOTIDINE 20 MG: 20 TABLET, FILM COATED ORAL at 08:21

## 2024-03-14 RX ADMIN — INSULIN LISPRO 8 UNITS: 100 INJECTION, SOLUTION INTRAVENOUS; SUBCUTANEOUS at 11:39

## 2024-03-14 RX ADMIN — INSULIN LISPRO 4 UNITS: 100 INJECTION, SOLUTION INTRAVENOUS; SUBCUTANEOUS at 22:44

## 2024-03-14 RX ADMIN — ENOXAPARIN SODIUM 40 MG: 100 INJECTION SUBCUTANEOUS at 08:22

## 2024-03-14 RX ADMIN — INSULIN GLARGINE 10 UNITS: 100 INJECTION, SOLUTION SUBCUTANEOUS at 08:29

## 2024-03-14 RX ADMIN — DIVALPROEX SODIUM 500 MG: 500 TABLET, EXTENDED RELEASE ORAL at 21:29

## 2024-03-14 RX ADMIN — INSULIN LISPRO 8 UNITS: 100 INJECTION, SOLUTION INTRAVENOUS; SUBCUTANEOUS at 16:51

## 2024-03-14 RX ADMIN — FAMOTIDINE 20 MG: 20 TABLET, FILM COATED ORAL at 21:29

## 2024-03-14 RX ADMIN — CLONAZEPAM 1 MG: 1 TABLET ORAL at 21:29

## 2024-03-14 RX ADMIN — ROSUVASTATIN CALCIUM 10 MG: 5 TABLET, COATED ORAL at 21:29

## 2024-03-14 RX ADMIN — QUETIAPINE FUMARATE 200 MG: 100 TABLET ORAL at 21:29

## 2024-03-14 RX ADMIN — PANTOPRAZOLE SODIUM 40 MG: 40 TABLET, DELAYED RELEASE ORAL at 05:39

## 2024-03-14 NOTE — CARE COORDINATION
Reviewed chart and discussed in Md rounds. Pt continues to make progress. Pt feeding herself now and is more alert. Pt took steps over to the chair using walker. Decreased meds appear to have helped as she is not as sedated and is alert and cooperative. Sw cont to follow her PT progress and as soon as pt ambulating more Sw to call group home and have them assess her for return. Anjali Sidhu/AIXA

## 2024-03-14 NOTE — PLAN OF CARE
Problem: Discharge Planning  Goal: Discharge to home or other facility with appropriate resources  Outcome: Progressing     Problem: Safety - Adult  Goal: Free from fall injury  Outcome: Progressing     Problem: Skin/Tissue Integrity  Goal: Absence of new skin breakdown  Description: 1.  Monitor for areas of redness and/or skin breakdown  2.  Assess vascular access sites hourly  3.  Every 4-6 hours minimum:  Change oxygen saturation probe site  4.  Every 4-6 hours:  If on nasal continuous positive airway pressure, respiratory therapy assess nares and determine need for appliance change or resting period.  Outcome: Progressing     Problem: ABCDS Injury Assessment  Goal: Absence of physical injury  Outcome: Progressing     Problem: Pain  Goal: Verbalizes/displays adequate comfort level or baseline comfort level  Outcome: Progressing

## 2024-03-15 LAB
GLUCOSE BLD STRIP.AUTO-MCNC: 185 MG/DL (ref 65–100)
GLUCOSE BLD STRIP.AUTO-MCNC: 282 MG/DL (ref 65–100)
GLUCOSE BLD STRIP.AUTO-MCNC: 284 MG/DL (ref 65–100)
GLUCOSE BLD STRIP.AUTO-MCNC: 312 MG/DL (ref 65–100)
GLUCOSE BLD STRIP.AUTO-MCNC: 339 MG/DL (ref 65–100)
GLUCOSE BLD STRIP.AUTO-MCNC: 361 MG/DL (ref 65–100)
SERVICE CMNT-IMP: ABNORMAL

## 2024-03-15 PROCEDURE — 82962 GLUCOSE BLOOD TEST: CPT

## 2024-03-15 PROCEDURE — 6370000000 HC RX 637 (ALT 250 FOR IP): Performed by: STUDENT IN AN ORGANIZED HEALTH CARE EDUCATION/TRAINING PROGRAM

## 2024-03-15 PROCEDURE — 6360000002 HC RX W HCPCS: Performed by: STUDENT IN AN ORGANIZED HEALTH CARE EDUCATION/TRAINING PROGRAM

## 2024-03-15 PROCEDURE — 96372 THER/PROPH/DIAG INJ SC/IM: CPT

## 2024-03-15 PROCEDURE — 97530 THERAPEUTIC ACTIVITIES: CPT

## 2024-03-15 PROCEDURE — G0378 HOSPITAL OBSERVATION PER HR: HCPCS

## 2024-03-15 PROCEDURE — 6370000000 HC RX 637 (ALT 250 FOR IP): Performed by: INTERNAL MEDICINE

## 2024-03-15 RX ADMIN — AMANTADINE HYDROCHLORIDE 100 MG: 100 CAPSULE, LIQUID FILLED ORAL at 20:26

## 2024-03-15 RX ADMIN — QUETIAPINE FUMARATE 200 MG: 100 TABLET ORAL at 20:26

## 2024-03-15 RX ADMIN — ENOXAPARIN SODIUM 40 MG: 100 INJECTION SUBCUTANEOUS at 09:07

## 2024-03-15 RX ADMIN — FAMOTIDINE 20 MG: 20 TABLET, FILM COATED ORAL at 20:26

## 2024-03-15 RX ADMIN — FAMOTIDINE 20 MG: 20 TABLET, FILM COATED ORAL at 09:06

## 2024-03-15 RX ADMIN — CLONAZEPAM 1 MG: 1 TABLET ORAL at 20:26

## 2024-03-15 RX ADMIN — DIVALPROEX SODIUM 500 MG: 500 TABLET, EXTENDED RELEASE ORAL at 20:35

## 2024-03-15 RX ADMIN — INSULIN GLARGINE 14 UNITS: 100 INJECTION, SOLUTION SUBCUTANEOUS at 09:07

## 2024-03-15 RX ADMIN — INSULIN LISPRO 6 UNITS: 100 INJECTION, SOLUTION INTRAVENOUS; SUBCUTANEOUS at 12:12

## 2024-03-15 RX ADMIN — INSULIN LISPRO 4 UNITS: 100 INJECTION, SOLUTION INTRAVENOUS; SUBCUTANEOUS at 17:03

## 2024-03-15 RX ADMIN — METOPROLOL SUCCINATE 25 MG: 25 TABLET, EXTENDED RELEASE ORAL at 09:06

## 2024-03-15 RX ADMIN — AMANTADINE HYDROCHLORIDE 100 MG: 100 CAPSULE, LIQUID FILLED ORAL at 09:06

## 2024-03-15 RX ADMIN — INSULIN LISPRO 4 UNITS: 100 INJECTION, SOLUTION INTRAVENOUS; SUBCUTANEOUS at 20:24

## 2024-03-15 RX ADMIN — ROSUVASTATIN CALCIUM 10 MG: 5 TABLET, COATED ORAL at 20:25

## 2024-03-15 RX ADMIN — PANTOPRAZOLE SODIUM 40 MG: 40 TABLET, DELAYED RELEASE ORAL at 05:28

## 2024-03-15 NOTE — CARE COORDINATION
Pt chart reviewed and discussed with AM IDR for continued stay.  LOS 13 days. Pt admitted with generalized weakness from Group Home. Per MD, pt improving, is more alert and oriented today, and will need tele-psych consult tomorrow.    Will continue to follow pt plan of care and assist with any supportive care needs that arise as appropriate.

## 2024-03-15 NOTE — PLAN OF CARE
Problem: Discharge Planning  Goal: Discharge to home or other facility with appropriate resources  Outcome: Progressing  Flowsheets (Taken 3/14/2024 1932 by Mac Wolf RN)  Discharge to home or other facility with appropriate resources:   Identify barriers to discharge with patient and caregiver   Identify discharge learning needs (meds, wound care, etc)     Problem: Safety - Adult  Goal: Free from fall injury  Outcome: Progressing     Problem: Skin/Tissue Integrity  Goal: Absence of new skin breakdown  Description: 1.  Monitor for areas of redness and/or skin breakdown  2.  Assess vascular access sites hourly  3.  Every 4-6 hours minimum:  Change oxygen saturation probe site  4.  Every 4-6 hours:  If on nasal continuous positive airway pressure, respiratory therapy assess nares and determine need for appliance change or resting period.  Outcome: Progressing     Problem: ABCDS Injury Assessment  Goal: Absence of physical injury  Outcome: Progressing     Problem: Pain  Goal: Verbalizes/displays adequate comfort level or baseline comfort level  Outcome: Progressing      PT here in wheelchair for txt after exm with NP. PT transfer one person assist to recliner. Txt reviewed with pt. Lab results approved for txt. Iv team started iv. Txt administered as ordered and pt tolerated without any problems. txt completed/ iv dc'd and pressure dressing to site. Nephew called to pick pt up. Pt dc'd in wheelchair

## 2024-03-16 LAB
GLUCOSE BLD STRIP.AUTO-MCNC: 167 MG/DL (ref 65–100)
GLUCOSE BLD STRIP.AUTO-MCNC: 286 MG/DL (ref 65–100)
GLUCOSE BLD STRIP.AUTO-MCNC: 304 MG/DL (ref 65–100)
GLUCOSE BLD STRIP.AUTO-MCNC: 310 MG/DL (ref 65–100)
GLUCOSE BLD STRIP.AUTO-MCNC: 376 MG/DL (ref 65–100)
GLUCOSE BLD STRIP.AUTO-MCNC: 388 MG/DL (ref 65–100)
SERVICE CMNT-IMP: ABNORMAL

## 2024-03-16 PROCEDURE — 6370000000 HC RX 637 (ALT 250 FOR IP): Performed by: STUDENT IN AN ORGANIZED HEALTH CARE EDUCATION/TRAINING PROGRAM

## 2024-03-16 PROCEDURE — 93005 ELECTROCARDIOGRAM TRACING: CPT | Performed by: HOSPITALIST

## 2024-03-16 PROCEDURE — 96372 THER/PROPH/DIAG INJ SC/IM: CPT

## 2024-03-16 PROCEDURE — 97535 SELF CARE MNGMENT TRAINING: CPT

## 2024-03-16 PROCEDURE — G0378 HOSPITAL OBSERVATION PER HR: HCPCS

## 2024-03-16 PROCEDURE — 6370000000 HC RX 637 (ALT 250 FOR IP): Performed by: INTERNAL MEDICINE

## 2024-03-16 PROCEDURE — 97530 THERAPEUTIC ACTIVITIES: CPT

## 2024-03-16 PROCEDURE — 84484 ASSAY OF TROPONIN QUANT: CPT

## 2024-03-16 PROCEDURE — 36415 COLL VENOUS BLD VENIPUNCTURE: CPT

## 2024-03-16 PROCEDURE — 97168 OT RE-EVAL EST PLAN CARE: CPT

## 2024-03-16 PROCEDURE — 6360000002 HC RX W HCPCS: Performed by: STUDENT IN AN ORGANIZED HEALTH CARE EDUCATION/TRAINING PROGRAM

## 2024-03-16 PROCEDURE — 82962 GLUCOSE BLOOD TEST: CPT

## 2024-03-16 PROCEDURE — 6370000000 HC RX 637 (ALT 250 FOR IP): Performed by: HOSPITALIST

## 2024-03-16 RX ORDER — INSULIN LISPRO 100 [IU]/ML
6 INJECTION, SOLUTION INTRAVENOUS; SUBCUTANEOUS ONCE
Status: COMPLETED | OUTPATIENT
Start: 2024-03-16 | End: 2024-03-16

## 2024-03-16 RX ORDER — METOPROLOL SUCCINATE 50 MG/1
50 TABLET, EXTENDED RELEASE ORAL DAILY
Status: DISCONTINUED | OUTPATIENT
Start: 2024-03-16 | End: 2024-03-17

## 2024-03-16 RX ORDER — INSULIN GLARGINE 100 [IU]/ML
18 INJECTION, SOLUTION SUBCUTANEOUS DAILY
Status: DISCONTINUED | OUTPATIENT
Start: 2024-03-16 | End: 2024-03-18

## 2024-03-16 RX ORDER — INSULIN LISPRO 100 [IU]/ML
4 INJECTION, SOLUTION INTRAVENOUS; SUBCUTANEOUS ONCE
Status: COMPLETED | OUTPATIENT
Start: 2024-03-16 | End: 2024-03-16

## 2024-03-16 RX ADMIN — ENOXAPARIN SODIUM 40 MG: 100 INJECTION SUBCUTANEOUS at 09:23

## 2024-03-16 RX ADMIN — ROSUVASTATIN CALCIUM 10 MG: 5 TABLET, COATED ORAL at 20:35

## 2024-03-16 RX ADMIN — INSULIN LISPRO 6 UNITS: 100 INJECTION, SOLUTION INTRAVENOUS; SUBCUTANEOUS at 18:09

## 2024-03-16 RX ADMIN — FAMOTIDINE 20 MG: 20 TABLET, FILM COATED ORAL at 09:23

## 2024-03-16 RX ADMIN — INSULIN LISPRO 4 UNITS: 100 INJECTION, SOLUTION INTRAVENOUS; SUBCUTANEOUS at 13:41

## 2024-03-16 RX ADMIN — CLONAZEPAM 1 MG: 1 TABLET ORAL at 20:35

## 2024-03-16 RX ADMIN — PANTOPRAZOLE SODIUM 40 MG: 40 TABLET, DELAYED RELEASE ORAL at 05:52

## 2024-03-16 RX ADMIN — AMANTADINE HYDROCHLORIDE 100 MG: 100 CAPSULE, LIQUID FILLED ORAL at 09:23

## 2024-03-16 RX ADMIN — FAMOTIDINE 20 MG: 20 TABLET, FILM COATED ORAL at 20:35

## 2024-03-16 RX ADMIN — METOPROLOL SUCCINATE 50 MG: 50 TABLET, EXTENDED RELEASE ORAL at 09:22

## 2024-03-16 RX ADMIN — INSULIN LISPRO 4 UNITS: 100 INJECTION, SOLUTION INTRAVENOUS; SUBCUTANEOUS at 22:17

## 2024-03-16 RX ADMIN — INSULIN GLARGINE 18 UNITS: 100 INJECTION, SOLUTION SUBCUTANEOUS at 09:23

## 2024-03-16 RX ADMIN — AMANTADINE HYDROCHLORIDE 100 MG: 100 CAPSULE, LIQUID FILLED ORAL at 20:35

## 2024-03-16 RX ADMIN — INSULIN LISPRO 4 UNITS: 100 INJECTION, SOLUTION INTRAVENOUS; SUBCUTANEOUS at 20:34

## 2024-03-16 RX ADMIN — DIVALPROEX SODIUM 500 MG: 500 TABLET, EXTENDED RELEASE ORAL at 20:35

## 2024-03-16 RX ADMIN — INSULIN LISPRO 8 UNITS: 100 INJECTION, SOLUTION INTRAVENOUS; SUBCUTANEOUS at 17:52

## 2024-03-16 RX ADMIN — QUETIAPINE FUMARATE 200 MG: 100 TABLET ORAL at 20:35

## 2024-03-16 NOTE — PLAN OF CARE
Problem: Discharge Planning  Goal: Discharge to home or other facility with appropriate resources  3/16/2024 1005 by Angela Marlow RN  Outcome: Progressing  3/15/2024 2144 by Shari Romano RN  Outcome: Progressing  Flowsheets (Taken 3/15/2024 2036)  Discharge to home or other facility with appropriate resources: Identify discharge learning needs (meds, wound care, etc)     Problem: Safety - Adult  Goal: Free from fall injury  3/16/2024 1005 by Angela Marlow RN  Outcome: Progressing  3/15/2024 2144 by Shari Romano RN  Outcome: Progressing     Problem: Skin/Tissue Integrity  Goal: Absence of new skin breakdown  Description: 1.  Monitor for areas of redness and/or skin breakdown  2.  Assess vascular access sites hourly  3.  Every 4-6 hours minimum:  Change oxygen saturation probe site  4.  Every 4-6 hours:  If on nasal continuous positive airway pressure, respiratory therapy assess nares and determine need for appliance change or resting period.  3/16/2024 1005 by Angela Marlow RN  Outcome: Progressing  3/15/2024 2144 by Shari Romano RN  Outcome: Progressing     Problem: ABCDS Injury Assessment  Goal: Absence of physical injury  3/16/2024 1005 by Angela Marlow RN  Outcome: Progressing  3/15/2024 2144 by Shari Romano RN  Outcome: Progressing     Problem: Pain  Goal: Verbalizes/displays adequate comfort level or baseline comfort level  3/16/2024 1005 by Angela Marlow RN  Outcome: Progressing  3/15/2024 2144 by Shari Romano RN  Outcome: Progressing

## 2024-03-16 NOTE — PLAN OF CARE
Problem: Discharge Planning  Goal: Discharge to home or other facility with appropriate resources  3/15/2024 2144 by Shari Romano RN  Outcome: Progressing  Flowsheets (Taken 3/15/2024 2036)  Discharge to home or other facility with appropriate resources: Identify discharge learning needs (meds, wound care, etc)  3/15/2024 0926 by Angela Marlow RN  Outcome: Progressing  Flowsheets (Taken 3/14/2024 1932 by Mac Wolf RN)  Discharge to home or other facility with appropriate resources:   Identify barriers to discharge with patient and caregiver   Identify discharge learning needs (meds, wound care, etc)     Problem: Safety - Adult  Goal: Free from fall injury  3/15/2024 2144 by Shari Romano RN  Outcome: Progressing  3/15/2024 0926 by Angela Marlow RN  Outcome: Progressing     Problem: Skin/Tissue Integrity  Goal: Absence of new skin breakdown  Description: 1.  Monitor for areas of redness and/or skin breakdown  2.  Assess vascular access sites hourly  3.  Every 4-6 hours minimum:  Change oxygen saturation probe site  4.  Every 4-6 hours:  If on nasal continuous positive airway pressure, respiratory therapy assess nares and determine need for appliance change or resting period.  3/15/2024 2144 by Shari Romano RN  Outcome: Progressing  3/15/2024 0926 by Angela Marlow RN  Outcome: Progressing     Problem: ABCDS Injury Assessment  Goal: Absence of physical injury  3/15/2024 2144 by Shari Romano RN  Outcome: Progressing  3/15/2024 0926 by Angela Marlow RN  Outcome: Progressing     Problem: Pain  Goal: Verbalizes/displays adequate comfort level or baseline comfort level  3/15/2024 2144 by Shari Romano RN  Outcome: Progressing  3/15/2024 0926 by Angela Marlow RN  Outcome: Progressing

## 2024-03-17 LAB
GLUCOSE BLD STRIP.AUTO-MCNC: 183 MG/DL (ref 65–100)
GLUCOSE BLD STRIP.AUTO-MCNC: 184 MG/DL (ref 65–100)
GLUCOSE BLD STRIP.AUTO-MCNC: 261 MG/DL (ref 65–100)
GLUCOSE BLD STRIP.AUTO-MCNC: 306 MG/DL (ref 65–100)
GLUCOSE BLD STRIP.AUTO-MCNC: 342 MG/DL (ref 65–100)
GLUCOSE BLD STRIP.AUTO-MCNC: 356 MG/DL (ref 65–100)
SERVICE CMNT-IMP: ABNORMAL
TROPONIN I SERPL HS-MCNC: 4.2 PG/ML (ref 0–14)
TROPONIN I SERPL HS-MCNC: 4.7 PG/ML (ref 0–14)
TROPONIN I SERPL HS-MCNC: 4.8 PG/ML (ref 0–14)

## 2024-03-17 PROCEDURE — 82962 GLUCOSE BLOOD TEST: CPT

## 2024-03-17 PROCEDURE — 6360000002 HC RX W HCPCS: Performed by: STUDENT IN AN ORGANIZED HEALTH CARE EDUCATION/TRAINING PROGRAM

## 2024-03-17 PROCEDURE — G0378 HOSPITAL OBSERVATION PER HR: HCPCS

## 2024-03-17 PROCEDURE — 6370000000 HC RX 637 (ALT 250 FOR IP): Performed by: STUDENT IN AN ORGANIZED HEALTH CARE EDUCATION/TRAINING PROGRAM

## 2024-03-17 PROCEDURE — 6370000000 HC RX 637 (ALT 250 FOR IP): Performed by: INTERNAL MEDICINE

## 2024-03-17 PROCEDURE — 96372 THER/PROPH/DIAG INJ SC/IM: CPT

## 2024-03-17 PROCEDURE — 97530 THERAPEUTIC ACTIVITIES: CPT

## 2024-03-17 PROCEDURE — 84484 ASSAY OF TROPONIN QUANT: CPT

## 2024-03-17 PROCEDURE — 36415 COLL VENOUS BLD VENIPUNCTURE: CPT

## 2024-03-17 RX ORDER — METOPROLOL SUCCINATE 100 MG/1
100 TABLET, EXTENDED RELEASE ORAL DAILY
Status: DISCONTINUED | OUTPATIENT
Start: 2024-03-17 | End: 2024-03-19 | Stop reason: HOSPADM

## 2024-03-17 RX ADMIN — INSULIN LISPRO 4 UNITS: 100 INJECTION, SOLUTION INTRAVENOUS; SUBCUTANEOUS at 21:01

## 2024-03-17 RX ADMIN — QUETIAPINE FUMARATE 200 MG: 100 TABLET ORAL at 20:59

## 2024-03-17 RX ADMIN — CLONAZEPAM 1 MG: 1 TABLET ORAL at 21:00

## 2024-03-17 RX ADMIN — PANTOPRAZOLE SODIUM 40 MG: 40 TABLET, DELAYED RELEASE ORAL at 05:32

## 2024-03-17 RX ADMIN — INSULIN GLARGINE 18 UNITS: 100 INJECTION, SOLUTION SUBCUTANEOUS at 09:16

## 2024-03-17 RX ADMIN — AMANTADINE HYDROCHLORIDE 100 MG: 100 CAPSULE, LIQUID FILLED ORAL at 09:16

## 2024-03-17 RX ADMIN — INSULIN LISPRO 4 UNITS: 100 INJECTION, SOLUTION INTRAVENOUS; SUBCUTANEOUS at 12:53

## 2024-03-17 RX ADMIN — ENOXAPARIN SODIUM 40 MG: 100 INJECTION SUBCUTANEOUS at 09:16

## 2024-03-17 RX ADMIN — AMANTADINE HYDROCHLORIDE 100 MG: 100 CAPSULE, LIQUID FILLED ORAL at 21:00

## 2024-03-17 RX ADMIN — ROSUVASTATIN CALCIUM 10 MG: 5 TABLET, COATED ORAL at 21:00

## 2024-03-17 RX ADMIN — FAMOTIDINE 20 MG: 20 TABLET, FILM COATED ORAL at 21:00

## 2024-03-17 RX ADMIN — INSULIN LISPRO 6 UNITS: 100 INJECTION, SOLUTION INTRAVENOUS; SUBCUTANEOUS at 17:25

## 2024-03-17 RX ADMIN — DIVALPROEX SODIUM 500 MG: 500 TABLET, EXTENDED RELEASE ORAL at 21:00

## 2024-03-17 RX ADMIN — METOPROLOL SUCCINATE 100 MG: 100 TABLET, EXTENDED RELEASE ORAL at 09:16

## 2024-03-17 NOTE — PLAN OF CARE
Problem: Discharge Planning  Goal: Discharge to home or other facility with appropriate resources  3/16/2024 2132 by Shari Romano RN  Outcome: Progressing  Flowsheets (Taken 3/16/2024 1905)  Discharge to home or other facility with appropriate resources: Identify barriers to discharge with patient and caregiver  3/16/2024 1005 by Angela Marlow RN  Outcome: Progressing     Problem: Safety - Adult  Goal: Free from fall injury  3/16/2024 2132 by Shari Romano RN  Outcome: Progressing  3/16/2024 1005 by Angela Marlow RN  Outcome: Progressing     Problem: Skin/Tissue Integrity  Goal: Absence of new skin breakdown  Description: 1.  Monitor for areas of redness and/or skin breakdown  2.  Assess vascular access sites hourly  3.  Every 4-6 hours minimum:  Change oxygen saturation probe site  4.  Every 4-6 hours:  If on nasal continuous positive airway pressure, respiratory therapy assess nares and determine need for appliance change or resting period.  3/16/2024 2132 by Shari Romano RN  Outcome: Progressing  3/16/2024 1005 by Angela Marlow RN  Outcome: Progressing     Problem: ABCDS Injury Assessment  Goal: Absence of physical injury  3/16/2024 2132 by Shari Romano RN  Outcome: Progressing  3/16/2024 1005 by Angela Marlow RN  Outcome: Progressing     Problem: Pain  Goal: Verbalizes/displays adequate comfort level or baseline comfort level  3/16/2024 2132 by Shari Romano RN  Outcome: Progressing  3/16/2024 1005 by Angela Marlow RN  Outcome: Progressing

## 2024-03-17 NOTE — PLAN OF CARE
Problem: Discharge Planning  Goal: Discharge to home or other facility with appropriate resources  3/17/2024 1022 by Angela Marlow RN  Outcome: Progressing  3/16/2024 2132 by Shari Romano RN  Outcome: Progressing  Flowsheets (Taken 3/16/2024 1905)  Discharge to home or other facility with appropriate resources: Identify barriers to discharge with patient and caregiver     Problem: Safety - Adult  Goal: Free from fall injury  3/17/2024 1022 by Angela Marlow RN  Outcome: Progressing  3/16/2024 2132 by Shari Romano RN  Outcome: Progressing     Problem: Skin/Tissue Integrity  Goal: Absence of new skin breakdown  Description: 1.  Monitor for areas of redness and/or skin breakdown  2.  Assess vascular access sites hourly  3.  Every 4-6 hours minimum:  Change oxygen saturation probe site  4.  Every 4-6 hours:  If on nasal continuous positive airway pressure, respiratory therapy assess nares and determine need for appliance change or resting period.  3/17/2024 1022 by Angela Marlow RN  Outcome: Progressing  3/16/2024 2132 by Shari Romano RN  Outcome: Progressing     Problem: ABCDS Injury Assessment  Goal: Absence of physical injury  3/17/2024 1022 by Angela Marlow RN  Outcome: Progressing  3/16/2024 2132 by Shari Romano RN  Outcome: Progressing     Problem: Pain  Goal: Verbalizes/displays adequate comfort level or baseline comfort level  3/17/2024 1022 by Angela Marlow RN  Outcome: Progressing  3/16/2024 2132 by Shari Romano RN  Outcome: Progressing

## 2024-03-17 NOTE — CARE COORDINATION
PT reports patient is doing better and that CM can call the group home on Monday to reassess for return.

## 2024-03-18 LAB
ANION GAP SERPL CALC-SCNC: 4 MMOL/L (ref 2–11)
BASOPHILS # BLD: 0 K/UL (ref 0–0.2)
BASOPHILS NFR BLD: 1 % (ref 0–2)
BUN SERPL-MCNC: 13 MG/DL (ref 6–23)
CALCIUM SERPL-MCNC: 9.3 MG/DL (ref 8.3–10.4)
CHLORIDE SERPL-SCNC: 111 MMOL/L (ref 103–113)
CO2 SERPL-SCNC: 30 MMOL/L (ref 21–32)
CREAT SERPL-MCNC: 0.58 MG/DL (ref 0.6–1)
DIFFERENTIAL METHOD BLD: ABNORMAL
EKG ATRIAL RATE: 109 BPM
EKG DIAGNOSIS: NORMAL
EKG P AXIS: 51 DEGREES
EKG P-R INTERVAL: 144 MS
EKG Q-T INTERVAL: 340 MS
EKG QRS DURATION: 112 MS
EKG QTC CALCULATION (BAZETT): 457 MS
EKG R AXIS: -61 DEGREES
EKG T AXIS: 33 DEGREES
EKG VENTRICULAR RATE: 109 BPM
EOSINOPHIL # BLD: 0.1 K/UL (ref 0–0.8)
EOSINOPHIL NFR BLD: 1 % (ref 0.5–7.8)
ERYTHROCYTE [DISTWIDTH] IN BLOOD BY AUTOMATED COUNT: 14.1 % (ref 11.9–14.6)
GLUCOSE BLD STRIP.AUTO-MCNC: 170 MG/DL (ref 65–100)
GLUCOSE BLD STRIP.AUTO-MCNC: 184 MG/DL (ref 65–100)
GLUCOSE BLD STRIP.AUTO-MCNC: 195 MG/DL (ref 65–100)
GLUCOSE BLD STRIP.AUTO-MCNC: 205 MG/DL (ref 65–100)
GLUCOSE BLD STRIP.AUTO-MCNC: 274 MG/DL (ref 65–100)
GLUCOSE BLD STRIP.AUTO-MCNC: 312 MG/DL (ref 65–100)
GLUCOSE SERPL-MCNC: 165 MG/DL (ref 65–100)
HCT VFR BLD AUTO: 39.2 % (ref 35.8–46.3)
HGB BLD-MCNC: 12.3 G/DL (ref 11.7–15.4)
IMM GRANULOCYTES # BLD AUTO: 0 K/UL (ref 0–0.5)
IMM GRANULOCYTES NFR BLD AUTO: 0 % (ref 0–5)
LYMPHOCYTES # BLD: 5 K/UL (ref 0.5–4.6)
LYMPHOCYTES NFR BLD: 67 % (ref 13–44)
MCH RBC QN AUTO: 31.5 PG (ref 26.1–32.9)
MCHC RBC AUTO-ENTMCNC: 31.4 G/DL (ref 31.4–35)
MCV RBC AUTO: 100.5 FL (ref 82–102)
MONOCYTES # BLD: 0.7 K/UL (ref 0.1–1.3)
MONOCYTES NFR BLD: 9 % (ref 4–12)
NEUTS SEG # BLD: 1.7 K/UL (ref 1.7–8.2)
NEUTS SEG NFR BLD: 22 % (ref 43–78)
NRBC # BLD: 0.07 K/UL (ref 0–0.2)
PLATELET # BLD AUTO: 210 K/UL (ref 150–450)
PMV BLD AUTO: 10 FL (ref 9.4–12.3)
POTASSIUM SERPL-SCNC: 3.8 MMOL/L (ref 3.5–5.1)
RBC # BLD AUTO: 3.9 M/UL (ref 4.05–5.2)
SERVICE CMNT-IMP: ABNORMAL
SODIUM SERPL-SCNC: 145 MMOL/L (ref 136–146)
WBC # BLD AUTO: 7.5 K/UL (ref 4.3–11.1)

## 2024-03-18 PROCEDURE — 82962 GLUCOSE BLOOD TEST: CPT

## 2024-03-18 PROCEDURE — 96372 THER/PROPH/DIAG INJ SC/IM: CPT

## 2024-03-18 PROCEDURE — 6370000000 HC RX 637 (ALT 250 FOR IP): Performed by: STUDENT IN AN ORGANIZED HEALTH CARE EDUCATION/TRAINING PROGRAM

## 2024-03-18 PROCEDURE — G0378 HOSPITAL OBSERVATION PER HR: HCPCS

## 2024-03-18 PROCEDURE — 6370000000 HC RX 637 (ALT 250 FOR IP): Performed by: HOSPITALIST

## 2024-03-18 PROCEDURE — 80048 BASIC METABOLIC PNL TOTAL CA: CPT

## 2024-03-18 PROCEDURE — 85025 COMPLETE CBC W/AUTO DIFF WBC: CPT

## 2024-03-18 PROCEDURE — 6360000002 HC RX W HCPCS: Performed by: STUDENT IN AN ORGANIZED HEALTH CARE EDUCATION/TRAINING PROGRAM

## 2024-03-18 PROCEDURE — 97530 THERAPEUTIC ACTIVITIES: CPT

## 2024-03-18 PROCEDURE — 93010 ELECTROCARDIOGRAM REPORT: CPT | Performed by: INTERNAL MEDICINE

## 2024-03-18 PROCEDURE — 6370000000 HC RX 637 (ALT 250 FOR IP): Performed by: INTERNAL MEDICINE

## 2024-03-18 PROCEDURE — 36415 COLL VENOUS BLD VENIPUNCTURE: CPT

## 2024-03-18 RX ORDER — INSULIN GLARGINE 100 [IU]/ML
20 INJECTION, SOLUTION SUBCUTANEOUS DAILY
Status: DISCONTINUED | OUTPATIENT
Start: 2024-03-18 | End: 2024-03-19 | Stop reason: HOSPADM

## 2024-03-18 RX ORDER — INSULIN LISPRO 100 [IU]/ML
4 INJECTION, SOLUTION INTRAVENOUS; SUBCUTANEOUS ONCE
Status: COMPLETED | OUTPATIENT
Start: 2024-03-18 | End: 2024-03-18

## 2024-03-18 RX ADMIN — AMANTADINE HYDROCHLORIDE 100 MG: 100 CAPSULE, LIQUID FILLED ORAL at 21:09

## 2024-03-18 RX ADMIN — INSULIN GLARGINE 20 UNITS: 100 INJECTION, SOLUTION SUBCUTANEOUS at 08:22

## 2024-03-18 RX ADMIN — INSULIN LISPRO 4 UNITS: 100 INJECTION, SOLUTION INTRAVENOUS; SUBCUTANEOUS at 21:09

## 2024-03-18 RX ADMIN — DIVALPROEX SODIUM 500 MG: 500 TABLET, EXTENDED RELEASE ORAL at 21:09

## 2024-03-18 RX ADMIN — INSULIN LISPRO 4 UNITS: 100 INJECTION, SOLUTION INTRAVENOUS; SUBCUTANEOUS at 16:35

## 2024-03-18 RX ADMIN — FAMOTIDINE 20 MG: 20 TABLET, FILM COATED ORAL at 21:09

## 2024-03-18 RX ADMIN — ENOXAPARIN SODIUM 40 MG: 100 INJECTION SUBCUTANEOUS at 08:22

## 2024-03-18 RX ADMIN — FAMOTIDINE 20 MG: 20 TABLET, FILM COATED ORAL at 08:23

## 2024-03-18 RX ADMIN — PANTOPRAZOLE SODIUM 40 MG: 40 TABLET, DELAYED RELEASE ORAL at 05:41

## 2024-03-18 RX ADMIN — CLONAZEPAM 1 MG: 1 TABLET ORAL at 21:09

## 2024-03-18 RX ADMIN — AMANTADINE HYDROCHLORIDE 100 MG: 100 CAPSULE, LIQUID FILLED ORAL at 08:23

## 2024-03-18 RX ADMIN — ROSUVASTATIN CALCIUM 10 MG: 5 TABLET, COATED ORAL at 21:09

## 2024-03-18 RX ADMIN — INSULIN LISPRO 4 UNITS: 100 INJECTION, SOLUTION INTRAVENOUS; SUBCUTANEOUS at 00:09

## 2024-03-18 RX ADMIN — QUETIAPINE FUMARATE 200 MG: 100 TABLET ORAL at 21:09

## 2024-03-18 ASSESSMENT — PAIN SCALES - GENERAL: PAINLEVEL_OUTOF10: 0

## 2024-03-18 NOTE — PLAN OF CARE
Problem: Discharge Planning  Goal: Discharge to home or other facility with appropriate resources  Outcome: Progressing  Flowsheets (Taken 3/17/2024 8885)  Discharge to home or other facility with appropriate resources: Identify discharge learning needs (meds, wound care, etc)     Problem: Safety - Adult  Goal: Free from fall injury  Outcome: Progressing     Problem: Skin/Tissue Integrity  Goal: Absence of new skin breakdown  Description: 1.  Monitor for areas of redness and/or skin breakdown  2.  Assess vascular access sites hourly  3.  Every 4-6 hours minimum:  Change oxygen saturation probe site  4.  Every 4-6 hours:  If on nasal continuous positive airway pressure, respiratory therapy assess nares and determine need for appliance change or resting period.  Outcome: Progressing     Problem: ABCDS Injury Assessment  Goal: Absence of physical injury  Outcome: Progressing     Problem: Pain  Goal: Verbalizes/displays adequate comfort level or baseline comfort level  Outcome: Progressing

## 2024-03-18 NOTE — CARE COORDINATION
MORALES placed call to Shivani Dean ([phone]), director of [facility], to request re-eval for patient's return. [facility] requested that CM contact Alexa Singh ([phone]) for re-eval. MORALES spoke with Alexa who states she will try to find someone to come do in-person eval today and will call CM back. CM following.     Update 1355: CM placed additional call to Alexa at [facility] and sent message inquiring status of re-evaluation. Awaiting return call.     Update 1440: CM received message from Alexa that one of their nurses from [facility] is coming to see patient today.

## 2024-03-18 NOTE — CARE COORDINATION
VINNY CM notified by RN patient can return to group home tomorrow between 10:00am and 2:00pm, with rolling walker, and PT/OT.     VINNY completed DME of RW referral to [facility]. Referral previously sent to [facility] PT/OT/RN. VINNY updated [facility] liaison of pt's plan to discharge tomorrow.     SW to contact [facility] tomorrow in AM to schedule transport.

## 2024-03-19 VITALS
WEIGHT: 139.5 LBS | HEIGHT: 63 IN | DIASTOLIC BLOOD PRESSURE: 71 MMHG | HEART RATE: 84 BPM | SYSTOLIC BLOOD PRESSURE: 101 MMHG | TEMPERATURE: 98 F | OXYGEN SATURATION: 97 % | RESPIRATION RATE: 16 BRPM | BODY MASS INDEX: 24.72 KG/M2

## 2024-03-19 LAB
GLUCOSE BLD STRIP.AUTO-MCNC: 179 MG/DL (ref 65–100)
GLUCOSE BLD STRIP.AUTO-MCNC: 285 MG/DL (ref 65–100)
SERVICE CMNT-IMP: ABNORMAL
SERVICE CMNT-IMP: ABNORMAL

## 2024-03-19 PROCEDURE — 6360000002 HC RX W HCPCS: Performed by: STUDENT IN AN ORGANIZED HEALTH CARE EDUCATION/TRAINING PROGRAM

## 2024-03-19 PROCEDURE — 97530 THERAPEUTIC ACTIVITIES: CPT

## 2024-03-19 PROCEDURE — G0378 HOSPITAL OBSERVATION PER HR: HCPCS

## 2024-03-19 PROCEDURE — 96372 THER/PROPH/DIAG INJ SC/IM: CPT

## 2024-03-19 PROCEDURE — 6370000000 HC RX 637 (ALT 250 FOR IP): Performed by: INTERNAL MEDICINE

## 2024-03-19 PROCEDURE — 82962 GLUCOSE BLOOD TEST: CPT

## 2024-03-19 PROCEDURE — 6370000000 HC RX 637 (ALT 250 FOR IP): Performed by: STUDENT IN AN ORGANIZED HEALTH CARE EDUCATION/TRAINING PROGRAM

## 2024-03-19 RX ORDER — CLONAZEPAM 1 MG/1
1 TABLET ORAL NIGHTLY
Qty: 30 TABLET | Refills: 0 | Status: SHIPPED | OUTPATIENT
Start: 2024-03-19 | End: 2024-04-18

## 2024-03-19 RX ORDER — INSULIN GLARGINE 100 [IU]/ML
15 INJECTION, SOLUTION SUBCUTANEOUS DAILY
Qty: 5 ADJUSTABLE DOSE PRE-FILLED PEN SYRINGE | Refills: 1 | Status: SHIPPED | OUTPATIENT
Start: 2024-03-19

## 2024-03-19 RX ORDER — METOPROLOL SUCCINATE 50 MG/1
50 TABLET, EXTENDED RELEASE ORAL DAILY
Qty: 30 TABLET | Refills: 0 | Status: SHIPPED | OUTPATIENT
Start: 2024-03-19

## 2024-03-19 RX ORDER — QUETIAPINE FUMARATE 200 MG/1
200 TABLET, FILM COATED ORAL NIGHTLY
Qty: 60 TABLET | Refills: 0 | Status: SHIPPED | OUTPATIENT
Start: 2024-03-19

## 2024-03-19 RX ORDER — DIVALPROEX SODIUM 500 MG/1
500 TABLET, EXTENDED RELEASE ORAL NIGHTLY
Qty: 30 TABLET | Refills: 0 | Status: SHIPPED | OUTPATIENT
Start: 2024-03-19

## 2024-03-19 RX ADMIN — ENOXAPARIN SODIUM 40 MG: 100 INJECTION SUBCUTANEOUS at 08:06

## 2024-03-19 RX ADMIN — PANTOPRAZOLE SODIUM 40 MG: 40 TABLET, DELAYED RELEASE ORAL at 06:09

## 2024-03-19 RX ADMIN — FAMOTIDINE 20 MG: 20 TABLET, FILM COATED ORAL at 08:08

## 2024-03-19 RX ADMIN — INSULIN LISPRO 4 UNITS: 100 INJECTION, SOLUTION INTRAVENOUS; SUBCUTANEOUS at 17:14

## 2024-03-19 RX ADMIN — AMANTADINE HYDROCHLORIDE 100 MG: 100 CAPSULE, LIQUID FILLED ORAL at 08:07

## 2024-03-19 RX ADMIN — INSULIN GLARGINE 20 UNITS: 100 INJECTION, SOLUTION SUBCUTANEOUS at 08:06

## 2024-03-19 NOTE — PROGRESS NOTES
Hospitalist Progress Note   Admit Date:  3/2/2024 10:40 AM   Name:  Kenia Levy   Age:  53 y.o.  Sex:  female  :  1970   MRN:  [id number]   Room:  [facility]/    Presenting/Chief Complaint: Altered Mental Status     Reason(s) for Admission: Generalized weakness [R53.1]  Altered mental status, unspecified altered mental status type [R41.82]  COVID [U07.1]     Hospital Course:   53-year-old female with schizoaffective disorder, IDDM 2 presents from her group home 3/2 with lethargy, decreased mental status and found to be COVID-19 positive.      Subjective & 24hr Events:   Patient was seen and examined at bedside.  Drowsiness returns again, but awakens easily to voice. She has no complaints.     Assessment & Plan:   COVID-19 infection  - Without hypoxemia  - No indication for dexamethasone at this time  - Continue supportive care     Generalized weakness  - Likely secondary to COVID-19 infection  - PT/OT     Drowsiness  -- 2/2 polypharmacy. Resolved , then returned  with resumption of prior seroquel dose  -- resume holding home seroquel, continue to hold home risperidone  -- when restarting seroquel will start at 200 1/2 home dose  -- continue home klonopin     Schizoaffective disorder  - Continue home amantadine, divalproex, klonopin  -- hold home risperidone and seroquel for now      GERD  - Continue home omeprazole, Pepcid     Hypertension  - Continue home metoprolol succinate     Type 2 diabetes  - Continue to monitor blood glucose  - Sliding scale insulin  - Hypoglycemic protocol     Hyperlipidemia  - Continue home rosuvastatin    PT/OT evals and PPD ordered?  Therapy   Diet:  ADULT ORAL NUTRITION SUPPLEMENT; Breakfast, Lunch, Dinner; Diabetic Oral Supplement  ADULT DIET; Dysphagia - Soft and Bite Sized; 5 carb choices (75 gm/meal); 1-1 assist with intake  VTE prophylaxis: Lovenox  Code status: Full Code      Non-peripheral Lines and Tubes (if present):      External Urinary Catheter 
       Hospitalist Progress Note   Admit Date:  3/2/2024 10:40 AM   Name:  Kenia Levy   Age:  53 y.o.  Sex:  female  :  1970   MRN:  [id number]   Room:  [facility]    Presenting/Chief Complaint: Altered Mental Status     Reason(s) for Admission: Generalized weakness [R53.1]  Altered mental status, unspecified altered mental status type [R41.82]  COVID [U07.1]     Hospital Course:   Please refer to the admission H&P for details of presentation.      In summary, Kenia Levy is a 53 y.o. female with medical history of schizoaffective disorder, IDDM 2 presents from her group home 3/2 with lethargy, decreased mental status and found to be COVID-19 positive.      Subjective/24 hr Events (24) :  Patient is seen and examined at bedside.  No acute events reported overnight by nursing staff.  Patient sitting up in a recliner.  Recently worked with occupational therapy this morning  Alert, awake and pleasant.  Cooperative.  Answering questions with slow delay.  At baseline.  Does not have any complaints at this time.  Denies fever, chills, chest pains, shortness of breath, n/v, abdominal pain.    Assessment & Plan:   COVID-19 infection  Patient remains asymptomatic from COVID-19 infection.  Still on room air without any signs of hypoxia.  No indication for dexamethasone at this time.    - Continue supportive care     Generalized weakness  Secondary COVID-19 infection  - PT/OT.     Drowsiness  Due to polypharmacy.     Resolved , then returned  with resumption of prior seroquel dose.  24: resolved after medications adjustment.    - Psych consulted and meds changed made as per recommendation as below.    Schizoaffective disorder  Severe with history of suicide attempts.  Home regimen was 400 mg Seroquel BID, 0.5 mg Risperidone BID, Depakote 750 mg BID, and Klonopin 1 mg BID   - Psych has been consulted. Medication adjusted based on their recommendations.   - klonopin 1mg at night, seroquel 200mg 
       Hospitalist Progress Note   Admit Date:  3/2/2024 10:40 AM   Name:  Kenia Levy   Age:  53 y.o.  Sex:  female  :  1970   MRN:  [id number]   Room:  [facility]/    Presenting/Chief Complaint: Altered Mental Status     Reason(s) for Admission: Generalized weakness [R53.1]  Altered mental status, unspecified altered mental status type [R41.82]  COVID [U07.1]     Hospital Course:   53-year-old female with schizoaffective disorder, IDDM 2 presents from her group home 3/2 with lethargy, decreased mental status and found to be COVID-19 positive.      Subjective & 24hr Events:   Patient was seen and examined at bedside.  Drowsiness remains resolved today. Feels her usual self. No new complaints.     Assessment & Plan:   COVID-19 infection  - Without hypoxemia  - No indication for dexamethasone at this time  - Continue supportive care     Generalized weakness  - Likely secondary to COVID-19 infection  - PT/OT  - UA pending     Drowsiness  -- resolved   -- resume home seroquel, continue to hold home risperidone  -- continue home klonopin (was not held)  -- woke up after CT scan, talking, moving extremities  -- good saturation on continuous pulse oximetry  -- CT head negatvie  -- continue other medications    Schizoaffective disorder  - Continue home amantadine, quetiapine, divalproex, klonopin  -- hold home risperidone and seroquel for now      GERD  - Continue home omeprazole, Pepcid     Hypertension  - Continue home metoprolol succinate     Type 2 diabetes  - Continue to monitor blood glucose  - Sliding scale insulin  - Hypoglycemic protocol     Hyperlipidemia  - Continue home rosuvastatin    PT/OT evals and PPD ordered?  Therapy   Diet:  ADULT DIET; Dysphagia - Soft and Bite Sized; 5 carb choices (75 gm/meal); 1-1 assist with intake  ADULT ORAL NUTRITION SUPPLEMENT; Breakfast, Lunch, Dinner; Diabetic Oral Supplement  VTE prophylaxis: Lovenox  Code status: Full Code      Non-peripheral Lines and Tubes (if 
       Hospitalist Progress Note   Admit Date:  3/2/2024 10:40 AM   Name:  Kenia Levy   Age:  53 y.o.  Sex:  female  :  1970   MRN:  [id number]   Room:  [facility]/    Presenting/Chief Complaint: Altered Mental Status     Reason(s) for Admission: Generalized weakness [R53.1]  Altered mental status, unspecified altered mental status type [R41.82]  COVID [U07.1]     Hospital Course:   Please refer to the admission H&P for details of presentation.      In summary, Kenia Levy is a 53 y.o. female with medical history of schizoaffective disorder, IDDM 2 presents from her group home 3/2 with lethargy, decreased mental status and found to be COVID-19 positive.      Subjective/24 hr Events (03/15/24) :  Patient is seen and examined at bedside.  No acute events reported overnight by nursing staff.  Mentation continues to improved and likely now at baseline.   Sitting up in her bed. Pleasant and cooperative.  Denies fever, chills, cp, sob, abdominal pain.  Per PT/OT, pt has been making some progress.    Assessment & Plan:   COVID-19 infection  Patient remains asymptomatic from COVID-19 infection.  Still on room air without any signs of hypoxia.  No indication for dexamethasone at this time.    - Continue supportive care     Generalized weakness  Secondary COVID-19 infection  - PT/OT.     Drowsiness  Due to polypharmacy.   Resolved , then returned  with resumption of prior seroquel dose.  Now resolved.     Schizoaffective disorder  Severe with history of suicide attempts.  Home regimen was 400 mg Seroquel BID, 0.5 mg Risperidone BID, Depakote 750 mg BID, and Klonopin 1 mg BID   - Psych has been consulted. Medication adjusted based on their recommendations.   - klonopin 1mg at night, seroquel 200mg nightly, valprioc acid 500mg po nightly.  - valproic acid level within normal  - Continue home amantadine     GERD  - Continue home omeprazole, Pepcid     Hypertension  - Continue home metoprolol succinate   
       Hospitalist Progress Note   Admit Date:  3/2/2024 10:40 AM   Name:  Kenia Levy   Age:  53 y.o.  Sex:  female  :  1970   MRN:  [id number]   Room:  [facility]    Presenting/Chief Complaint: Altered Mental Status     Reason(s) for Admission: Generalized weakness [R53.1]  Altered mental status, unspecified altered mental status type [R41.82]  COVID [U07.1]     Hospital Course:   53-year-old female with schizoaffective disorder, IDDM 2 presents from her group home 3/2 with lethargy, decreased mental status and found to be COVID-19 positive.     Subjective & 24hr Events:   Patient was seen and examined at bedside.  No overnight events.  No complaints this morning.      Assessment & Plan:   COVID-19 infection  - Without hypoxemia  - No indication for dexamethasone at this time  - Continue supportive care    Generalized weakness/altered mental status  - Likely secondary to COVID-19 infection  - PT/OT  - UA pending    Schizoaffective disorder  - Continue home risperidone, clonazepam, amantadine, quetiapine, divalproex    GERD  - Continue home omeprazole, Pepcid    Hypertension  - Continue home metoprolol succinate    Type 2 diabetes  - Continue to monitor blood glucose  - Sliding scale insulin  - Hypoglycemic protocol    Hyperlipidemia  - Continue home rosuvastatin    PT/OT evals and PPD ordered?  Therapy   Diet:  ADULT DIET; Regular; 5 carb choices (75 gm/meal)  VTE prophylaxis: Lovenox  Code status: Full Code      Non-peripheral Lines and Tubes (if present):          Telemetry (if present):  Cardiac/Telemetry Monitor On: No        Hospital Problems:  Principal Problem:    Generalized weakness  Resolved Problems:    * No resolved hospital problems. *      Objective:   Patient Vitals for the past 24 hrs:   Temp Pulse Resp BP SpO2   24 0731 98.2 °F (36.8 °C) 99 -- 122/74 94 %   24 97.9 °F (36.6 °C) 94 20 (!) 149/83 96 %   24 97.9 °F (36.6 °C) 93 20 (!) 149/83 96 %   24 1715 -- 
       Hospitalist Progress Note   Admit Date:  3/2/2024 10:40 AM   Name:  Kenia Levy   Age:  53 y.o.  Sex:  female  :  1970   MRN:  [id number]   Room:  [facility]/    Presenting/Chief Complaint: Altered Mental Status     Reason(s) for Admission: Generalized weakness [R53.1]  Altered mental status, unspecified altered mental status type [R41.82]  COVID [U07.1]     Hospital Course:   53-year-old female with schizoaffective disorder, IDDM 2 presents from her group home 3/2 with lethargy, decreased mental status and found to be COVID-19 positive.      Subjective & 24hr Events:   Patient was seen and examined at bedside.  Drowsiness resolved today. Awake, alert. Feels well. No complaints. Eating and drinking well. No pain. Stooling and voiding well.    Assessment & Plan:   COVID-19 infection  - Without hypoxemia  - No indication for dexamethasone at this time  - Continue supportive care     Generalized weakness  - Likely secondary to COVID-19 infection  - PT/OT     Drowsiness  -- 2/2 polypharmacy. Resolved , then returned  with resumption of prior seroquel dose  -- resolved  with holding antipsychotics. It is clear this is the cause for her sedation. Unfortunately she has a good medical indication for high doses but will need adjustments while here  -- resume seroquel at 200 mg BID, continue to hold home risperidone  -- consult psychiatry  -- continue home klonopin     Schizoaffective disorder  - Severe with history of suicide attempts  -- home regimen was 400 mg Seroquel BID, 0.5 mg Risperidone BID, Depakote 750 mg BID, and Klonopin 1 mg BID   -- Continue home amantadine, divalproex, klonopin  -- hold home risperidone and resume seroquel as above     GERD  - Continue home omeprazole, Pepcid     Hypertension  - Continue home metoprolol succinate     Type 2 diabetes  - Continue to monitor blood glucose  - Sliding scale insulin  - Hypoglycemic protocol     Hyperlipidemia  - Continue home 
  SPEECH LANGUAGE PATHOLOGY: DYSPHAGIA Initial Assessment and Discharge    Acknowledge Order  I  Therapy Time  I   Charges     I  Rehab Caseload Tracker      NAME: Kenia Levy  : 1970  MRN: [id number]    ADMISSION DATE: 3/2/2024  PRIMARY DIAGNOSIS: Generalized weakness    ICD-10: Treatment Diagnosis: R13.12 Dysphagia, Oropharyngeal Phase    RECOMMENDATIONS   Diet:    Regular Consistency  Thin Liquids    Medication: as tolerated   Compensatory Swallowing Strategies:   Small bites/sips  Upright as possible for all oral intake  Assist with PO intake  Reduce distractions during meals (for example,turn off television)   Therapeutic Intervention:   Instrumental swallow assessment  Dysphagia treatment   Patient continues to require skilled intervention:  No. Please re-consult if new concerns arise.      Anticipated Discharge Needs: No additional speech therapy is indicated.      ASSESSMENT    Patient's oral and pharyngeal phases of swallow present as unremarkable on PO trials. No overt signs of aspiration were noted and aside from requiring slightly increased time for prep of solids, no difficulty observed. Recommend to continue regular diet with thin liquids. Provide assistance with meals and eliminate distractions to improve safety with PO intake.     Patient does exhibit generalized delay when participating in therapy tasks and deficits observed related to attention and orientation. Previous hospitalization reports from outside facility do report patient exhibits delayed or slow speech. Records also report patient with cognitive delay/dysfunction (2022). Patient is not a reliable historian and caregivers currently not present to provide information on patient's previous level of function.     As multiple hospitalization reports report patient with delay in speech production and impaired cognitive status, suspect patient at/near baseline. Speech therapy services will sign off at this time. If change in 
2008  MD notified of POC glucose result. No additional orders at this time.    2207  MD notified of updated POC. New orders received for additional 4 units of Humalog to be given.   MD notified of patients HR. New orders received for portable tele.   
5293  Patient placed on tele. Per monitor room patients cardiac rhythm is sinus tach at 118 BPM. Monitor room tech stated \"Her S-T segment looks a little elevated.\" Patient denies CP/SOB. New vitals obtained. MD notified of all above information including vital sign values. New orders received for STAT EKG. This RN called EKG tech to notify of new orders.    9190  MD reviewed EKG. New orders received for troponin to be drawn now then Q2H for 3 occurences. This RN called  to notify of new stat orders.  
ACUTE OCCUPATIONAL THERAPY GOALS:   (Developed with and agreed upon by patient and/or caregiver.)  1. Patient will perform grooming with mod assist.  2. Patient will perform upper body dressing with mod assist.  3. Patient will perform upper body bathing with mod assist.  4. Patient will perform  toilet transfer with min assist.   5. Patient will feed herself finger foods with  min assist.  6. Patient will drink from a cup with a straw with SBA.       Goals to be achieved in 7 days.      OCCUPATIONAL THERAPY Daily Note and AM       OT Visit Days: 2  Acknowledge Orders  Time  OT Charge Capture  Rehab Caseload Tracker      Kenia Levy is a 53 y.o. female   PRIMARY DIAGNOSIS: Generalized weakness  Generalized weakness [R53.1]  Altered mental status, unspecified altered mental status type [R41.82]  COVID [U07.1]       Reason for Referral: Generalized Muscle Weakness (M62.81)  Other lack of cordination (R27.8)  Difficulty in walking, Not elsewhere classified (R26.2)  Other abnormalities of gait and mobility (R26.89)  Observation: [place] / [place] / Product Type: *No Product type* /     ASSESSMENT:     REHAB RECOMMENDATIONS:   Recommendation to date pending progress:  Setting:  Short-term Rehab     Equipment:    None     ASSESSMENT:  Ms. Levy  is a 53-year-old female with schizoaffective disorder, IDDM 2 presents from her group home 3/2 with lethargy, decreased mental status and found to be COVID-19 positive. She was  sitting on eob with PTA. She smiled but is very lethargic. She is having difficulty with sitting balance min assist for static sitting. She changed her gown with total assist. She stated she needed to use the restroom. She was continent.  She was mod x 2 to SPT to BSC with poor direction following. She was total for brief management and hygiene. She was mod x 2 to pivot back to eob. And poor sitting balance. She drank from a cup with a straw with multiple attempts for straw 
ACUTE OCCUPATIONAL THERAPY GOALS:   (Developed with and agreed upon by patient and/or caregiver.)  1. Patient will perform grooming with mod assist.  2. Patient will perform upper body dressing with mod assist.  3. Patient will perform upper body bathing with mod assist.  4. Patient will perform  toilet transfer with min assist.   5. Patient will feed herself finger foods with  min assist.  6. Patient will drink from a cup with a straw with SBA.       Goals to be achieved in 7 days.      OCCUPATIONAL THERAPY Daily Note and PM       OT Visit Days: 2  Acknowledge Orders  Time  OT Charge Capture  Rehab Caseload Tracker      Kenia Levy is a 53 y.o. female   PRIMARY DIAGNOSIS: Generalized weakness  Generalized weakness [R53.1]  Altered mental status, unspecified altered mental status type [R41.82]  COVID [U07.1]       Reason for Referral: Generalized Muscle Weakness (M62.81)  Other lack of cordination (R27.8)  Difficulty in walking, Not elsewhere classified (R26.2)  Other abnormalities of gait and mobility (R26.89)  Observation: [place] / [place] / Product Type: *No Product type* /     ASSESSMENT:     REHAB RECOMMENDATIONS:   Recommendation to date pending progress:  Setting:  Short-term Rehab     Equipment:    None     ASSESSMENT:  Ms. Levy  is a 53-year-old female with schizoaffective disorder, IDDM 2 presents from her group home 3/2 with lethargy, decreased mental status and found to be COVID-19 positive. She was  sitting on eob with PTA. She smiled but is very lethargic. She is having difficulty with sitting balance min assist for static sitting. She changed her gown with total assist. She stated she needed to use the restroom. She was continent.  She was mod x 2 to SPT to BSC with poor direction following. She was total for brief management and hygiene. She was mod x 2 to pivot back to eob. And poor sitting balance. She drank from a cup with a straw with multiple attempts for straw 
ACUTE OCCUPATIONAL THERAPY GOALS:   (Developed with and agreed upon by patient and/or caregiver.)  1. Patient will perform grooming with mod assist.  2. Patient will perform upper body dressing with mod assist.  3. Patient will perform upper body bathing with mod assist.  4. Patient will perform  toilet transfer with min assist.   5. Patient will feed herself finger foods with  min assist.progressing 3/14/2024   6. Patient will drink from a cup with a straw with SBA. Progressing 3/14/2024        Goals to be achieved in 7 days.      OCCUPATIONAL THERAPY Daily Note and AM       OT Visit Days: 5  Acknowledge Orders  Time  OT Charge Capture  Rehab Caseload Tracker      Kenia Levy is a 53 y.o. female   PRIMARY DIAGNOSIS: Generalized weakness  Generalized weakness [R53.1]  Altered mental status, unspecified altered mental status type [R41.82]  COVID [U07.1]       Reason for Referral: Generalized Muscle Weakness (M62.81)  Other lack of cordination (R27.8)  Difficulty in walking, Not elsewhere classified (R26.2)  Other abnormalities of gait and mobility (R26.89)  Observation: [place] / [place] / Product Type: *No Product type* /     ASSESSMENT:     REHAB RECOMMENDATIONS:   Recommendation to date pending progress:  Setting:  Short-term Rehab     Equipment:    None     ASSESSMENT:  Ms. Levy  is a 53-year-old female with schizoaffective disorder, IDDM 2 presents from her group home 3/2 with lethargy, decreased mental status and found to be COVID-19 positive. She was  sitting on eob with PTA. She smiled but is very lethargic. She is having difficulty with sitting balance min assist for static sitting. She changed her gown with total assist. She stated she needed to use the restroom. She was continent.  She was mod x 2 to SPT to BSC with poor direction following. She was total for brief management and hygiene. She was mod x 2 to pivot back to eob. And poor sitting balance. She drank from a cup 
ACUTE PHYSICAL THERAPY GOALS:   (Developed with and agreed upon by patient and/or caregiver.)  STG:  (1.)Ms. Levy will move from supine to sit and sit to supine  with STAND BY ASSIST within 4-7 treatment day(s).    (2.)Ms. Levy will transfer from bed to chair and chair to bed with STAND BY ASSIST using the least restrictive device within 4-7 treatment day(s).    (3.)Ms. Levy will ambulate with CONTACT GUARD ASSIST for 100 feet with the least restrictive device within 4-7 treatment day(s).   (4.)Ms. Levy will participate in lower extremity exercises to increase strength to assist with mobility within 4-7 treatment days.     ________________________________________________________________________________________________     PHYSICAL THERAPY Daily Note and AM  (Link to Caseload Tracking: PT Visit Days : 2  Acknowledge Orders  Time In/Out  PT Charge Capture  Rehab Caseload Tracker    Kenia Levy is a 53 y.o. female   PRIMARY DIAGNOSIS: Generalized weakness  Generalized weakness [R53.1]  Altered mental status, unspecified altered mental status type [R41.82]  COVID [U07.1]       Reason for Referral: Generalized Muscle Weakness (M62.81)  Difficulty in walking, Not elsewhere classified (R26.2)  Observation: [place] / [place] / Product Type: *No Product type* /     ASSESSMENT:     REHAB RECOMMENDATIONS:   Recommendation to date pending progress:  Setting:  Unsure how mobile she needs to be at group home, she will need some kind of follow up therapy for continued strengthening    Equipment:    To Be Determined     ASSESSMENT:  Ms. Levy is a 53 year old female admitted with above diagnosis. She comes to us from a group home with history of unidentified cognitive disorder. Pt pleasant and cooperative with therapy. She presents with gross generalized weakness and decreased independence with functional mobility. She appears to have slow processing and will at times repeat what you say. 
ACUTE PHYSICAL THERAPY GOALS:   (Developed with and agreed upon by patient and/or caregiver.)  STG:  (1.)Ms. Levy will move from supine to sit and sit to supine  with STAND BY ASSIST within 4-7 treatment day(s).    (2.)Ms. Levy will transfer from bed to chair and chair to bed with STAND BY ASSIST using the least restrictive device within 4-7 treatment day(s).    (3.)Ms. Levy will ambulate with CONTACT GUARD ASSIST for 100 feet with the least restrictive device within 4-7 treatment day(s).   (4.)Ms. Levy will participate in lower extremity exercises to increase strength to assist with mobility within 4-7 treatment days.     ________________________________________________________________________________________________     PHYSICAL THERAPY Daily Note and AM  (Link to Caseload Tracking: PT Visit Days : 6  Acknowledge Orders  Time In/Out  PT Charge Capture  Rehab Caseload Tracker    Kenia Levy is a 53 y.o. female   PRIMARY DIAGNOSIS: Generalized weakness  Generalized weakness [R53.1]  Altered mental status, unspecified altered mental status type [R41.82]  COVID [U07.1]       Reason for Referral: Generalized Muscle Weakness (M62.81)  Difficulty in walking, Not elsewhere classified (R26.2)  Observation: [place] / [place] / Product Type: *No Product type* /     ASSESSMENT:     REHAB RECOMMENDATIONS:   Recommendation to date pending progress:  Setting:  Continue with therapy will here in the hospital.  SW looking for LTP    Equipment:    To Be Determined     ASSESSMENT:  Ms. Levy is a 53 year old female admitted with above diagnosis. She comes to us from a group home with history of unidentified cognitive disorder. Pt pleasant and cooperative with therapy. She presents with gross generalized weakness and decreased independence with functional mobility. She appears to have slow processing and will at times repeat what you say. She is weak in all her limbs, grossly 2 to 3/5. Today 
ACUTE PHYSICAL THERAPY GOALS:   (Developed with and agreed upon by patient and/or caregiver.)  STG:  (1.)Ms. Levy will move from supine to sit and sit to supine  with STAND BY ASSIST within 4-7 treatment day(s).    (2.)Ms. Levy will transfer from bed to chair and chair to bed with STAND BY ASSIST using the least restrictive device within 4-7 treatment day(s).    (3.)Ms. Levy will ambulate with CONTACT GUARD ASSIST for 100 feet with the least restrictive device within 4-7 treatment day(s).   (4.)Ms. Levy will participate in lower extremity exercises to increase strength to assist with mobility within 4-7 treatment days.     ________________________________________________________________________________________________     PHYSICAL THERAPY Daily Note and PM  (Link to Caseload Tracking: PT Visit Days : 2  Acknowledge Orders  Time In/Out  PT Charge Capture  Rehab Caseload Tracker    Kenia Levy is a 53 y.o. female   PRIMARY DIAGNOSIS: Generalized weakness  Generalized weakness [R53.1]  Altered mental status, unspecified altered mental status type [R41.82]  COVID [U07.1]       Reason for Referral: Generalized Muscle Weakness (M62.81)  Difficulty in walking, Not elsewhere classified (R26.2)  [place]ation[place] / Product Type: *No Product type* /     ASSESSMENT:     REHAB RECOMMENDATIONS:   Recommendation to date pending progress:  Setting:  Continue with therapy will here in the hospital.  SW looking for LTP    Equipment:    To Be Determined     ASSESSMENT:  Ms. Levy is a 53 year old female admitted with above diagnosis. She comes to us from a group home with history of unidentified cognitive disorder. Pt pleasant and cooperative with therapy. She presents with gross generalized weakness and decreased independence with functional mobility. She appears to have slow processing and will at times repeat what you say. She is weak in all her limbs, grossly 2 to 3/5. Today 
ACUTE PHYSICAL THERAPY GOALS:   (Developed with and agreed upon by patient and/or caregiver.)  STG:  (1.)Ms. Levy will move from supine to sit and sit to supine  with STAND BY ASSIST within 4-7 treatment day(s).    (2.)Ms. Levy will transfer from bed to chair and chair to bed with STAND BY ASSIST using the least restrictive device within 4-7 treatment day(s).    (3.)Ms. Levy will ambulate with CONTACT GUARD ASSIST for 100 feet with the least restrictive device within 4-7 treatment day(s).   (4.)Ms. Levy will participate in lower extremity exercises to increase strength to assist with mobility within 4-7 treatment days.     ________________________________________________________________________________________________     PHYSICAL THERAPY Daily Note and PM  (Link to Caseload Tracking: PT Visit Days : 5  Acknowledge Orders  Time In/Out  PT Charge Capture  Rehab Caseload Tracker    Kenia Levy is a 53 y.o. female   PRIMARY DIAGNOSIS: Generalized weakness  Generalized weakness [R53.1]  Altered mental status, unspecified altered mental status type [R41.82]  COVID [U07.1]       Reason for Referral: Generalized Muscle Weakness (M62.81)  Difficulty in walking, Not elsewhere classified (R26.2)  Observation: [place] / Product Type: *No Product type* /     ASSESSMENT:     REHAB RECOMMENDATIONS:   Recommendation to date pending progress:  Setting:  Continue with therapy will here in the hospital.  SW looking for LTP    Equipment:    To Be Determined     ASSESSMENT:  Ms. Levy is a 53 year old female admitted with above diagnosis. She comes to us from a group home with history of unidentified cognitive disorder. Pt pleasant and cooperative with therapy. She presents with gross generalized weakness and decreased independence with functional mobility. She appears to have slow processing and will at times repeat what you say. She is weak in all her limbs, grossly 2 to 3/5. Today 
ACUTE PHYSICAL THERAPY GOALS:   (Developed with and agreed upon by patient and/or caregiver.)  STG:  (1.)Ms. Levy will move from supine to sit and sit to supine  with STAND BY ASSIST within 4-7 treatment day(s).    (2.)Ms. Levy will transfer from bed to chair and chair to bed with STAND BY ASSIST using the least restrictive device within 4-7 treatment day(s).    (3.)Ms. Levy will ambulate with CONTACT GUARD ASSIST for 100 feet with the least restrictive device within 4-7 treatment day(s).   (4.)Ms. Levy will participate in lower extremity exercises to increase strength to assist with mobility within 4-7 treatment days.     ________________________________________________________________________________________________     PHYSICAL THERAPY Initial Assessment and AM  (Link to Caseload Tracking: PT Visit Days : 1  Acknowledge Orders  Time In/Out  PT Charge Capture  Rehab Caseload Tracker    Kenia Levy is a 53 y.o. female   PRIMARY DIAGNOSIS: Generalized weakness  Generalized weakness [R53.1]  Altered mental status, unspecified altered mental status type [R41.82]  COVID [U07.1]       Reason for Referral: Generalized Muscle Weakness (M62.81)  Difficulty in walking, Not elsewhere classified (R26.2)  [place]ation: [place] / Product Type: *No Product type* /     ASSESSMENT:     REHAB RECOMMENDATIONS:   Recommendation to date pending progress:  Setting:  Unsure how mobile she needs to be at group home, she will need some kind of follow up therapy for continued strengthening    Equipment:    To Be Determined     ASSESSMENT:  Ms. Levy is a 53 year old female admitted with above diagnosis. She comes to us from a group home with history of unidentified cognitive disorder. Pt pleasant and cooperative with therapy. She presents with gross generalized weakness and decreased independence with functional mobility. She appears to have slow processing and will at times repeat what 
ACUTE PHYSICAL THERAPY GOALS:   (Developed with and agreed upon by patient and/or caregiver.)  STG:  (1.)Ms. Levy will move from supine to sit and sit to supine with CONTACT GUARD ASSIST within 4-7 treatment day(s).    (2.)Ms. Levy will transfer from bed to chair and chair to bed with MIN ASSIST using the least restrictive device within 4-7 treatment day(s).    (3.)Ms. Levy will ambulate with MIN ASSIST for 25 feet with the least restrictive device within 4-7 treatment day(s).   (4.)Ms. Levy will participate in lower extremity exercises to increase strength to assist with mobility within 4-7 treatment days.     ________________________________________________________________________________________________     PHYSICAL THERAPY Daily Note and AM  (Link to Caseload Tracking: PT Visit Days : 2  Acknowledge Orders  Time In/Out  PT Charge Capture  Rehab Caseload Tracker    Kenia Levy is a 53 y.o. female   PRIMARY DIAGNOSIS: Generalized weakness  Generalized weakness [R53.1]  Altered mental status, unspecified altered mental status type [R41.82]  COVID [U07.1]       Reason for Referral: Generalized Muscle Weakness (M62.81)  Difficulty in walking, Not elsewhere classified (R26.2)  [place]ation: [place] / Product Type: *No Product type* /     ASSESSMENT:     REHAB RECOMMENDATIONS:   Recommendation to date pending progress:  Setting:  Back to group home versus long term placement    Equipment:    To Be Determined     ASSESSMENT:  Ms. Levy is a 53 year old female admitted with above diagnosis. She comes to us from a group home with history of unidentified cognitive disorder. Pt pleasant and cooperative with therapy. She presents with gross generalized weakness and decreased independence with functional mobility. She appears to have slow processing and will at times repeat what you say. She is weak in all her limbs, grossly 2 to 3/5.   3/9/24: Per chart review, patient was 
ACUTE PHYSICAL THERAPY GOALS:   (Developed with and agreed upon by patient and/or caregiver.)  STG:  (1.)Ms. Levy will move from supine to sit and sit to supine with CONTACT GUARD ASSIST within 4-7 treatment day(s).    (2.)Ms. Levy will transfer from bed to chair and chair to bed with MIN ASSIST using the least restrictive device within 4-7 treatment day(s).    (3.)Ms. Levy will ambulate with MIN ASSIST for 25 feet with the least restrictive device within 4-7 treatment day(s).   (4.)Ms. Levy will participate in lower extremity exercises to increase strength to assist with mobility within 4-7 treatment days.     ________________________________________________________________________________________________     PHYSICAL THERAPY Daily Note and AM  (Link to Caseload Tracking: PT Visit Days : 4  Acknowledge Orders  Time In/Out  PT Charge Capture  Rehab Caseload Tracker    Kenia Levy is a 53 y.o. female   PRIMARY DIAGNOSIS: Generalized weakness  Generalized weakness [R53.1]  Altered mental status, unspecified altered mental status type [R41.82]  COVID [U07.1]       Reason for Referral: Generalized Muscle Weakness (M62.81)  Difficulty in walking, Not elsewhere classified (R26.2)  Observation: [place] / Product Type: *No Product type* /     ASSESSMENT:     REHAB RECOMMENDATIONS:   Recommendation to date pending progress:  Setting:  Back to group home versus long term placement    Equipment:    To Be Determined     ASSESSMENT:  Ms. Levy is a 53 year old female admitted with above diagnosis. She comes to us from a group home with history of unidentified cognitive disorder. Pt pleasant and cooperative with therapy. She presents with gross generalized weakness and decreased independence with functional mobility. She appears to have slow processing and will at times repeat what you say. She is weak in all her limbs, grossly 2 to 3/5.   3/9/24: Per chart review, patient was 
ACUTE PHYSICAL THERAPY GOALS:   (Developed with and agreed upon by patient and/or caregiver.)  STG:  (1.)Ms. Levy will move from supine to sit and sit to supine with CONTACT GUARD ASSIST within 4-7 treatment day(s).    (2.)Ms. Levy will transfer from bed to chair and chair to bed with MIN ASSIST using the least restrictive device within 4-7 treatment day(s).    (3.)Ms. Levy will ambulate with MIN ASSIST for 25 feet with the least restrictive device within 4-7 treatment day(s).   (4.)Ms. Levy will participate in lower extremity exercises to increase strength to assist with mobility within 4-7 treatment days.     ________________________________________________________________________________________________     PHYSICAL THERAPY Daily Note and AM  (Link to Caseload Tracking: PT Visit Days : 6  Acknowledge Orders  Time In/Out  PT Charge Capture  Rehab Caseload Tracker    Kenia Levy is a 53 y.o. female   PRIMARY DIAGNOSIS: Generalized weakness  Generalized weakness [R53.1]  Altered mental status, unspecified altered mental status type [R41.82]  COVID [U07.1]       Reason for Referral: Generalized Muscle Weakness (M62.81)  Difficulty in walking, Not elsewhere classified (R26.2)  Observation: [place] / Product Type: *No Product type* /     ASSESSMENT:     REHAB RECOMMENDATIONS:   Recommendation to date pending progress:  Setting:  Back to group home versus long term placement    Equipment:    To Be Determined     ASSESSMENT:  Ms. Levy is a 53 year old female admitted with above diagnosis. She comes to us from a group home with history of unidentified cognitive disorder. Pt pleasant and cooperative with therapy. She presents with gross generalized weakness and decreased independence with functional mobility. She appears to have slow processing and will at times repeat what you say. She is weak in all her limbs, grossly 2 to 3/5.   3/9/24: Per chart review, patient was 
ACUTE PHYSICAL THERAPY GOALS:   (Developed with and agreed upon by patient and/or caregiver.)  STG:  (1.)Ms. Levy will move from supine to sit and sit to supine with CONTACT GUARD ASSIST within 4-7 treatment day(s).    (2.)Ms. Levy will transfer from bed to chair and chair to bed with MIN ASSIST using the least restrictive device within 4-7 treatment day(s).    (3.)Ms. Levy will ambulate with MIN ASSIST for 25 feet with the least restrictive device within 4-7 treatment day(s).   (4.)Ms. Levy will participate in lower extremity exercises to increase strength to assist with mobility within 4-7 treatment days.     ________________________________________________________________________________________________     PHYSICAL THERAPY Daily Note and PM  (Link to Caseload Tracking: PT Visit Days : 2  Acknowledge Orders  Time In/Out  PT Charge Capture  Rehab Caseload Tracker    Kenia Levy is a 53 y.o. female   PRIMARY DIAGNOSIS: Generalized weakness  Generalized weakness [R53.1]  Altered mental status, unspecified altered mental status type [R41.82]  COVID [U07.1]       Reason for Referral: Generalized Muscle Weakness (M62.81)  Difficulty in walking, Not elsewhere classified (R26.2)  Observation: [place] / Product Type: *No Product type* /     ASSESSMENT:     REHAB RECOMMENDATIONS:   Recommendation to date pending progress:  Setting:  Back to group home versus long term placement    Equipment:    To Be Determined     ASSESSMENT:  Ms. Levy is a 53 year old female admitted with above diagnosis. She comes to us from a group home with history of unidentified cognitive disorder. Pt pleasant and cooperative with therapy. She presents with gross generalized weakness and decreased independence with functional mobility. She appears to have slow processing and will at times repeat what you say. She is weak in all her limbs, grossly 2 to 3/5.   3/9/24: Per chart review, patient was 
ACUTE PHYSICAL THERAPY GOALS:   (Developed with and agreed upon by patient and/or caregiver.)  STG:  (1.)Ms. Levy will move from supine to sit and sit to supine with CONTACT GUARD ASSIST within 4-7 treatment day(s).    (2.)Ms. Levy will transfer from bed to chair and chair to bed with MIN ASSIST using the least restrictive device within 4-7 treatment day(s).    (3.)Ms. Levy will ambulate with MIN ASSIST for 25 feet with the least restrictive device within 4-7 treatment day(s).   (4.)Ms. Levy will participate in lower extremity exercises to increase strength to assist with mobility within 4-7 treatment days.     ________________________________________________________________________________________________     PHYSICAL THERAPY Daily Note and PM  (Link to Caseload Tracking: PT Visit Days : 5  Acknowledge Orders  Time In/Out  PT Charge Capture  Rehab Caseload Tracker    Kenia Levy is a 53 y.o. female   PRIMARY DIAGNOSIS: Generalized weakness  Generalized weakness [R53.1]  Altered mental status, unspecified altered mental status type [R41.82]  COVID [U07.1]       Reason for Referral: Generalized Muscle Weakness (M62.81)  Difficulty in walking, Not elsewhere classified (R26.2)  Observation: [place] / Product Type: *No Product type* /     ASSESSMENT:     REHAB RECOMMENDATIONS:   Recommendation to date pending progress:  Setting:  Back to group home versus long term placement    Equipment:    To Be Determined     ASSESSMENT:  Ms. Levy is a 53 year old female admitted with above diagnosis. She comes to us from a group home with history of unidentified cognitive disorder. Pt pleasant and cooperative with therapy. She presents with gross generalized weakness and decreased independence with functional mobility. She appears to have slow processing and will at times repeat what you say. She is weak in all her limbs, grossly 2 to 3/5.   3/9/24: Per chart review, patient was 
ACUTE PHYSICAL THERAPY GOALS:   (Developed with and agreed upon by patient and/or caregiver.)  STG:  (1.)Ms. Levy will move from supine to sit and sit to supine with CONTACT GUARD ASSIST within 4-7 treatment day(s).    (2.)Ms. Levy will transfer from bed to chair and chair to bed with MIN ASSIST using the least restrictive device within 4-7 treatment day(s).    (3.)Ms. Levy will ambulate with MIN ASSIST for 25 feet with the least restrictive device within 4-7 treatment day(s).   (4.)Ms. Levy will participate in lower extremity exercises to increase strength to assist with mobility within 4-7 treatment days.     ________________________________________________________________________________________________     PHYSICAL THERAPY Daily Note, Re-evaluation, and PM  (Link to Caseload Tracking: PT Visit Days : 1  Acknowledge Orders  Time In/Out  PT Charge Capture  Rehab Caseload Tracker    Kenia Levy is a 53 y.o. female   PRIMARY DIAGNOSIS: Generalized weakness  Generalized weakness [R53.1]  Altered mental status, unspecified altered mental status type [R41.82]  COVID [U07.1]       Reason for Referral: Generalized Muscle Weakness (M62.81)  Difficulty in walking, Not elsewhere classified (R26.2)  Observation: [place] / Product Type: *No Product type* /     ASSESSMENT:     REHAB RECOMMENDATIONS:   Recommendation to date pending progress:  Setting:  Back to group home versus long term placement    Equipment:    To Be Determined     ASSESSMENT:  Ms. Levy is a 53 year old female admitted with above diagnosis. She comes to us from a group home with history of unidentified cognitive disorder. Pt pleasant and cooperative with therapy. She presents with gross generalized weakness and decreased independence with functional mobility. She appears to have slow processing and will at times repeat what you say. She is weak in all her limbs, grossly 2 to 3/5.   3/9/24: Per chart 
ACUTE PHYSICAL THERAPY GOALS:   (Developed with and agreed upon by patient and/or caregiver.)  STG:  (1.)Ms. Levy will move from supine to sit and sit to supine with CONTACT GUARD ASSIST within 4-7 treatment day(s).    (2.)Ms. Levy will transfer from bed to chair and chair to bed with MIN ASSIST using the least restrictive device within 4-7 treatment day(s).  -GOAL MET 3/16/24     (3.)Ms. Levy will ambulate with MIN ASSIST for 25 feet with the least restrictive device within 4-7 treatment day(s).   (4.)Ms. Levy will participate in lower extremity exercises to increase strength to assist with mobility within 4-7 treatment days.     NEW GOALS (3/16/24):  5.  Ms. Levy will transfer from bed to chair and chair to be with STANDBY ASSIST using the least restrictive device within 5 treatment days.  6.   will ambulate with STANDBY ASSIST for 100' with the least restrictive device within 5 treatment days.    ________________________________________________________________________________________________     PHYSICAL THERAPY Daily Note and AM  (Link to Caseload Tracking: PT Visit Days : 3  Acknowledge Orders  Time In/Out  PT Charge Capture  Rehab Caseload Tracker    Kenia Levy is a 53 y.o. female   PRIMARY DIAGNOSIS: Generalized weakness  Generalized weakness [R53.1]  Altered mental status, unspecified altered mental status type [R41.82]  COVID [U07.1]       Reason for Referral: Generalized Muscle Weakness (M62.81)  Difficulty in walking, Not elsewhere classified (R26.2)  Observation: [place] / Product Type: *No Product type* /     ASSESSMENT:     REHAB RECOMMENDATIONS:   Recommendation to date pending progress:  Setting:  Return to group home    Equipment:    To Be Determined-unsure of use of assistive device prior to admission     ASSESSMENT:  Ms. Levy participated very well today.  She has not been admitted for 14 days-came from a group home.  Per chart 
ACUTE PHYSICAL THERAPY GOALS:   (Developed with and agreed upon by patient and/or caregiver.)  STG:  (1.)Ms. Levy will move from supine to sit and sit to supine with CONTACT GUARD ASSIST within 4-7 treatment day(s).    (2.)Ms. Levy will transfer from bed to chair and chair to bed with MIN ASSIST using the least restrictive device within 4-7 treatment day(s).  -GOAL MET 3/16/24     (3.)Ms. Levy will ambulate with MIN ASSIST for 25 feet with the least restrictive device within 4-7 treatment day(s).   (4.)Ms. Levy will participate in lower extremity exercises to increase strength to assist with mobility within 4-7 treatment days.     NEW GOALS (3/16/24):  5.  Ms. Levy will transfer from bed to chair and chair to be with STANDBY ASSIST using the least restrictive device within 5 treatment days.  6.   will ambulate with STANDBY ASSIST for 100' with the least restrictive device within 5 treatment days.    ________________________________________________________________________________________________     PHYSICAL THERAPY Daily Note and PM  (Link to Caseload Tracking: PT Visit Days : 2  Acknowledge Orders  Time In/Out  PT Charge Capture  Rehab Caseload Tracker    Kenia Levy is a 53 y.o. female   PRIMARY DIAGNOSIS: Generalized weakness  Generalized weakness [R53.1]  Altered mental status, unspecified altered mental status type [R41.82]  COVID [U07.1]       Reason for Referral: Generalized Muscle Weakness (M62.81)  Difficulty in walking, Not elsewhere classified (R26.2)  Observation: [place] / Product Type: *No Product type* /     ASSESSMENT:     REHAB RECOMMENDATIONS:   Recommendation to date pending progress:  Setting:  Return to group home    Equipment:    To Be Determined-unsure of use of assistive device prior to admission     ASSESSMENT:  Ms. Levy participated very well today.  She has not been admitted for 14 days-came from a group home.  Per chart 
Comprehensive Nutrition Assessment    Type and Reason for Visit: Initial, RD Nutrition Re-Screen/LOS  Length of Stay    Nutrition Recommendations/Plan:   Meals and Snacks:  Diet: Downgrade soft and bite-sized  Nutrition Supplement Therapy:  Medical food supplement therapy:  Initiate Glucerna Shake three times per day (this provides 220 kcal and 10 grams protein per bottle)       Malnutrition Assessment:  Malnutrition Status: At risk for malnutrition (Comment) (inability to feed self or eat well)    NFPE unremarkable  Nutrition Assessment:  Nutrition History: Group home director in room reports patient was most independent of all the people at group home. She reports no issues prior to admission with eating or chewing.      Do You Have Any Cultural, Adventist, or Ethnic Food Preferences?: No   Weight History: RD took current bed weight of 140#. ERM wt history consistent.   2/26/24- 134#  12/6/23- 134#  8/8/23- 128#  4/26/24- 134#  Nutrition Background:       PMH: DM2, schizoaffective disorder. Patient presented with altered mental status and covid 19.   Nutrition Interval:  Patient in bed with group home director at bedside. Director reports that patient today needed to be completely fed and also was coughing/choking often with food. RD spoke with therapy patient can eat but needs lots of direction and reminders to chew during intake. They also report today mentation worse then prior. SLP has cleared for regular textures as not swallow issue but due to worsening mentation today safer to have on soft and bite sized. Discussed with RN.      Current Nutrition Therapies:  ADULT DIET; Regular; 5 carb choices (75 gm/meal); Finger foods. Márquez and egg sandwich at breakfast, sandwich or chicken fingers and fries at lunch and dinner    Current Intake:   Average Meal Intake: %, 26-50% Average Supplements Intake: None Ordered      Anthropometric Measures:  Height: 160 cm (5' 3\")  Current Body Wt: 63.6 kg (140 lb 3.2 oz) 
Comprehensive Nutrition Assessment    Type and Reason for Visit: Reassess      Nutrition Recommendations/Plan:   Meals and Snacks:  Diet: Continue current order except discontinue finger foods. Continue to include milk with all meals.  Nutrition Supplement Therapy:  Medical food supplement therapy:  Discontinue Glucerna Shake three times per day (this provides 220 kcal and 10 grams protein per bottle)       Malnutrition Assessment:3/8:  Malnutrition Status: At risk for malnutrition (Comment) (inability to feed self or eat well)    NFPE unremarkable  Nutrition Assessment:  Nutrition History: 3/5: Group home director in room reports patient was most independent of all the people at group home. She reports no issues prior to admission with eating or chewing.         Weight History: RD took current bed weight of 140#. ERM wt history consistent.   2/26/24- 134#  12/6/23- 134#  8/8/23- 128#  4/26/23- 134#  Nutrition Background:       PMH: DM2, schizoaffective disorder. Patient presented with altered mental status and weakness. Findings of +COVID 19, sinus tachycardia.   Nutrition Interval:  3/7: SLP eval-Regular diet with thin liquids    Patient seen sitting in bed. Lunch tray in room with 75% consumed. Pt fed self. She reports appetite is OK and denies current need for finger foods. Discussed with JAZMIN Roberto. She reports pt fed self breakfast this morning without any difficulty. Pt did drink milk but has not been drinking the Glucerna shakes.   Lab Results   Component Value Date/Time    POCGLU 179 03/19/2024 07:21 AM    POCGLU 312 03/18/2024 07:29 PM    POCGLU 274 03/18/2024 04:15 PM    POCGLU 195 03/18/2024 11:14 AM    POCGLU 170 03/18/2024 07:49 AM    POCGLU 184 03/18/2024 02:05 AM    Increased glucoses with improved po intake. ONS no longer needed as po intake adequate without ONS.    Current Nutrition Therapies:  ADULT ORAL NUTRITION SUPPLEMENT; Breakfast, Lunch, Dinner; Diabetic Oral Supplement  ADULT DIET; 
Comprehensive Nutrition Assessment    Type and Reason for Visit: Reassess  Length of Stay    Nutrition Recommendations/Plan:   Meals and Snacks:  Diet: Continue current order milk with all meals, finger foods  Nutrition Supplement Therapy:  Medical food supplement therapy:  Continue Glucerna Shake three times per day (this provides 220 kcal and 10 grams protein per bottle)  RD provided finger food meals list to kitchen for patient.      Malnutrition Assessment:  Malnutrition Status: At risk for malnutrition (Comment) (inability to feed self or eat well)    NFPE unremarkable  Nutrition Assessment:  Nutrition History: Group home director in room reports patient was most independent of all the people at group home. She reports no issues prior to admission with eating or chewing.      Do You Have Any Cultural, Mandaen, or Ethnic Food Preferences?: No   Weight History: RD took current bed weight of 140#. ERM wt history consistent.   2/26/24- 134#  12/6/23- 134#  8/8/23- 128#  4/26/24- 134#  Nutrition Background:       PMH: DM2, schizoaffective disorder. Patient presented with altered mental status and covid 19.   Nutrition Interval:  Patient in chair this am and much more awake and alert. Spoke with tech reported patient eating 100% of meals with assistance and drinking glucerna shakes. Requested finger foods again now that mentation improved. Spoke with PT this am who reports they brought built up utensil (red form) and patient able to use these better. Patient still needs some cueing and assistance with cutting or opening items (like glucerna) but otherwise much improved. Reported that patient consumed 100% of chicken nuggets and fries for dinner last night. At breakfast ate sausage, pancake and grits on own- did need help with cutting of pancakes and sausage. Drank 100% of glucerna if opened and straw in.     Current Nutrition Therapies:  ADULT ORAL NUTRITION SUPPLEMENT; Breakfast, Lunch, Dinner; Diabetic Oral 
Comprehensive Nutrition Assessment    Type and Reason for Visit: Reassess  Length of Stay    Nutrition Recommendations/Plan:   Meals and Snacks:  Diet: Downgrade soft and bite-sized  milk with all meals  Nutrition Supplement Therapy:  Medical food supplement therapy:  Continue Glucerna Shake three times per day (this provides 220 kcal and 10 grams protein per bottle)       Malnutrition Assessment:  Malnutrition Status: At risk for malnutrition (Comment) (inability to feed self or eat well)    NFPE unremarkable  Nutrition Assessment:  Nutrition History: Group home director in room reports patient was most independent of all the people at group home. She reports no issues prior to admission with eating or chewing.      Do You Have Any Cultural, Orthodoxy, or Ethnic Food Preferences?: No   Weight History: RD took current bed weight of 140#. ERM wt history consistent.   2/26/24- 134#  12/6/23- 134#  8/8/23- 128#  4/26/24- 134#  Nutrition Background:       PMH: DM2, schizoaffective disorder. Patient presented with altered mental status and covid 19.   Nutrition Interval:  Patient in chair this am and much more awake and alert. Patient able to answer some questions but sometimes still just repeats back what RD said. RD observed glucerna consumed at bedside. Discussed with susan Sarmiento who reports patient still weak with arms so being fed but eating very well with less issues. Plan to keep on soft and bite sized as easier for staff and patient to consume at this time. Although per SLP safe for regular texture.     Current Nutrition Therapies:  ADULT DIET; Dysphagia - Soft and Bite Sized; 5 carb choices (75 gm/meal); 1-1 assist with intake  ADULT ORAL NUTRITION SUPPLEMENT; Breakfast, Lunch, Dinner; Diabetic Oral Supplement    Current Intake:   Average Meal Intake: %, 26-50% Average Supplements Intake: 51-75%      Anthropometric Measures:  Height: 160 cm (5' 3\")  Current Body Wt: 63.6 kg (140 lb 3.2 oz) (3/8), Weight 
Discharge packet prepared/supplied by VINNY.  VINNY spoke with Shivani at group home for transport time/services.  
Fed herself 100% of dinner tray. Drank 2 ensures.  
MD notified of updated POC glucose. New orders received.  
MD notified of updated POC glucose. No new orders at this time.   
Nurse from [facility] (Aby Tori [phone]) assessed patient for return.  Nurse reviewed current medications and assisted patient with walking to restroom.  Aby stated patient would need a walker and PT/OT after discharge.  Patient will also need transportation set up to arrive at the [facility] between 10:00am and 2:00pm.  Notification made to therapy and CM.  
Nutrition Brief    Attempt finger food- regular texture- see if patient can fed self more easily without utensils. Full follow planned tomorrow, discussed with susan.     Michelle Hill RD    
Primary RN made aware of   
Psychiatric consult called in via phone.  Next available MD will call shortly.   
TRANSFER - OUT REPORT:    Verbal report given to JAZMIN Quach on Kenia Levy  being transferred to [facility] for routine progression of patient care       Report consisted of patient's Situation, Background, Assessment and   Recommendations(SBAR).     Information from the following report(s) Nurse Handoff Report was reviewed with the receiving nurse.                
quite lethargic yesterday and was unable to follow commands. She is more alert today and participated well with therapy. Required min A for supine to sit and mod A to scoot to EOB.  Worked on sit to stand transfers at RW with min A X 1-2. She was able to slowly take some steps over to recliner with min A of 2 and RW. Needed frequent verbal cues for sequencing and safety. Fatigues easily and knees started to buckle just as she reached the recliner. Performed LE exercises with assist while sitting up in recliner. Prior goals have not been met and new more appropriate goals were set for patient based on current functional level. D/C planning: back to group home versus long term placement.     3/14 supine upon arrival.  Work on bed mobility as follows:supine>scooting>eob with Min A and verbal cues.  Work on scooting more forward to get feet on the floor.  Sat on eob and work on balance shifting form L><R with/without support.  Sit>stand with RW in front and Min A with gait belt.  Pt took steps over to the chair  using RW with Min A and gait belt and verbal/tactile cues and demonstration.  While in the chair and performs B LE exercises (see below) with verbal cues and AA.  Remain in the chair with needs in reach and instructed to call for assist, before getting up.   PM sitting in the chair upon arrival.  Work on scooting forward in the chair with SBA.  Therapist put walker in front with verbal cues/demonstration going to get in the bed.  Pt stood up with CGA and stood a few min to process what she is going to do.  Therapist said remember to move your feet, and she did with extra time and Min A.  Sat on eob few min.  Sit>stand @ walker and took 3 steps to the foot of the bed, it takes a few min for processing.  Then sat down on the bed.  Pt said my pants need to be pull up.  Sit>stand again with Min A and pull up pants, then return to supine with CGA and verbal cues to get in the bed.  Remain in the bed with needs in 
with a straw with multiple attempts for straw use. OT assisted with holding and approximating cup. She returned to supine with min assist. She was set up with all her needs and  bed alarm on. CNA reported patient ate her entire meal today CNA fed her.  Nursing advised on patient status this afternoon and decreased independence. She is not at her baseline and would benefit from skilled OT . Will follow.    3-7-23 Pt supine on contact. Pt agreeable to work with therapy. Pt min assist to EOB. Pt stood and transferred to recliner with min assist. Pt max assist to scoot back in recliner. Pt positioned in chair with pillows to sit her up straight for feeding. Pt setup with breakfast tray. Pt worked on feeding with utensils. OT assisted with putting food on fork and letting pt feed herself. Pt slow to process. Pt needing max verbal and visual cues to chew her food completely. Pt asking for her drink. Pt drinking with cup and straw. Initially OT helping hold cup but then pt completed task on her own. Pt needs placement of food and drink close where she is able to pick it up. Pt left in recliner with needs in reach and RN notified. Continue OT POC    3/10/24 1300 3/10/865156 patient supine in bed opened eyes when spoken to but then closed them. Per CNA needed assistance to feed self today very lethargic. PT note from yesterday stated she was much more alert. OT assisted her with gown change as it was soiled in front. Patient max assist to raise UE to assist with gown change. Linens changed. All needs in reach. Patient with eyes closed, bed alarm on.     3/12/24 Pt supine on contact. Pt min assist to edge of bed. Pt min assist to recliner with verbal cues. Pt setup with breakfast tray.  Pt needed constant verbal cues to feed self. Pt unable to stab food with fork. Therapist would stab food with fork and let pt  fork to feed herself. Pt needing constant verbal and visual cues to take fork out of mouth and chew up food. 
Device Mode: Intermittent  Pulse Oximeter Device Location: Finger  O2 Device: None (Room air)    Estimated body mass index is 24.46 kg/m² as calculated from the following:    Height as of this encounter: 1.6 m (5' 3\").    Weight as of this encounter: 62.6 kg (138 lb 1.6 oz).    Intake/Output Summary (Last 24 hours) at 3/18/2024 1058  Last data filed at 3/18/2024 0534  Gross per 24 hour   Intake --   Output 1550 ml   Net -1550 ml           Physical Exam:     General:    Well nourished.    Head:  Normocephalic, atraumatic  Eyes:  Sclerae appear normal.  Pupils equally round.  ENT:  Nares appear normal.  Moist oral mucosa  Neck:  No restricted ROM.  Trachea midline   CV:   RRR.  No m/r/g.  No jugular venous distension.  Lungs:   CTAB.  No wheezing.  Symmetric expansion.  Abdomen:   Soft, nontender, nondistended.  Extremities: No cyanosis or clubbing.  No edema  Skin:     No rashes.  Normal coloration.   Warm and dry.    Neuro:  CN II-XII grossly intact.    Psych:  Cognitive delay.       I have personally reviewed labs and tests:  Recent Labs:  Recent Results (from the past 48 hour(s))   POCT Glucose    Collection Time: 03/16/24 12:16 PM   Result Value Ref Range    POC Glucose 286 (H) 65 - 100 mg/dL    Performed by: JessicalonJohannaPCT    POCT Glucose    Collection Time: 03/16/24  5:05 PM   Result Value Ref Range    POC Glucose 388 (H) 65 - 100 mg/dL    Performed by: DillonJohannaPCT    POCT Glucose    Collection Time: 03/16/24  8:06 PM   Result Value Ref Range    POC Glucose 376 (H) 65 - 100 mg/dL    Performed by: HemingSarahBSN    POCT Glucose    Collection Time: 03/16/24 10:00 PM   Result Value Ref Range    POC Glucose 304 (H) 65 - 100 mg/dL    Performed by: HemingSarahBSN    POCT Glucose    Collection Time: 03/16/24 10:59 PM   Result Value Ref Range    POC Glucose 310 (H) 65 - 100 mg/dL    Performed by: RoslainaSarahBSN    EKG 12 Lead    Collection Time: 03/16/24 11:02 PM   Result Value Ref Range    Ventricular Rate 109 
KhoanaPCT    POCT Glucose    Collection Time: 03/07/24 12:43 PM   Result Value Ref Range    POC Glucose 145 (H) 65 - 100 mg/dL    Performed by: KhoanaPCT    POCT Glucose    Collection Time: 03/07/24  5:27 PM   Result Value Ref Range    POC Glucose 182 (H) 65 - 100 mg/dL    Performed by: GomezJohannaPCT    POCT Glucose    Collection Time: 03/07/24  8:24 PM   Result Value Ref Range    POC Glucose 155 (H) 65 - 100 mg/dL    Performed by: CayetanoaPCT    POCT Glucose    Collection Time: 03/08/24  8:43 AM   Result Value Ref Range    POC Glucose 110 (H) 65 - 100 mg/dL    Performed by: ChristophercePCT    POCT Glucose    Collection Time: 03/08/24 10:38 AM   Result Value Ref Range    POC Glucose 151 (H) 65 - 100 mg/dL    Performed by: MarisQnarycePCT    POCT Glucose    Collection Time: 03/08/24 11:48 AM   Result Value Ref Range    POC Glucose 133 (H) 65 - 100 mg/dL    Performed by: MarisJoycePCT    POCT Glucose    Collection Time: 03/08/24  4:17 PM   Result Value Ref Range    POC Glucose 143 (H) 65 - 100 mg/dL    Performed by: MarisQnarycePCT        No results for input(s): \"COVID19\" in the last 72 hours.    Current Meds:  Current Facility-Administered Medications   Medication Dose Route Frequency    HYDROmorphone (DILAUDID) injection 0.25 mg  0.25 mg IntraVENous Q4H PRN    oxyCODONE (ROXICODONE) immediate release tablet 5 mg  5 mg Oral Q6H PRN    acetaminophen (TYLENOL) tablet 650 mg  650 mg Oral Q6H PRN    ondansetron (ZOFRAN) injection 4 mg  4 mg IntraVENous Q6H PRN    polyethylene glycol (GLYCOLAX) packet 17 g  17 g Oral Daily PRN    senna (SENOKOT) tablet 17.2 mg  2 tablet Oral Nightly PRN    melatonin tablet 1.5 mg  1.5 mg Oral Nightly PRN    aluminum & magnesium hydroxide-simethicone (MAALOX) 200-200-20 MG/5ML suspension 30 mL  30 mL Oral Q6H PRN    traZODone (DESYREL) tablet 50 mg  50 mg Oral Nightly PRN    ondansetron (ZOFRAN-ODT) disintegrating tablet 4 mg  4 mg Oral Q8H PRN    LORazepam (ATIVAN) 
Mobility Raw Score : 15  AM-PAC Inpatient T-Scale Score : 39.45  Mobility Inpatient CMS 0-100% Score: 57.7  Mobility Inpatient CMS G-Code Modifier : CK    SUBJECTIVE:   Ms. Levy agreeable to get up    Social/Functional Type of Home:  (group home)    OBJECTIVE:     PAIN: VITALS / O2: PRECAUTION / LINES / DRAINS:   Pre Treatment: 0/10         Post Treatment: 0/10 Vitals        Oxygen on room air      IV    RESTRICTIONS/PRECAUTIONS:                    GROSS EVALUATION:  Intact Impaired (Comments):   AROM []  Generally decreased, functional    PROM []    Strength []  Generally decreased, functional    Balance []     Posture [] Forward Head  Rounded Shoulders   Sensation []     Coordination []   Generally decreased, functional    Tone [x]     Edema []    Activity Tolerance []      []      COGNITION/  PERCEPTION: Intact Impaired (Comments):   Orientation []  Oriented to self and hospital setting   Vision []     Hearing [x]     Cognition  []  decreased     MOBILITY: I Mod I S SBA CGA Min Mod Max Total  NT x2 Comments:   Bed Mobility    Rolling [] [] [] [] [] [] [] [] [] [] []    Supine to Sit [] [] [] [] [] [x] [] [] [] [] [x]    Scooting [] [] [] [] [] [x] [] [] [] [] [x]    Sit to Supine [] [] [] [] [] [] [] [] [] [] [x]    Transfers    Sit to Stand [] [] [] [] [] [x] [] [] [] [] [x]    Bed to Chair [] [] [] [] [] [x] [] [] [] [] [x]    Stand to Sit [] [] [] [] [] [x] [] [] [] [] [x]     [] [] [] [] [] [] [] [] [] [] []    I=Independent, Mod I=Modified Independent, S=Supervision, SBA=Standby Assistance, CGA=Contact Guard Assistance,   Min=Minimal Assistance, Mod=Moderate Assistance, Max=Maximal Assistance, Total=Total Assistance, NT=Not Tested    GAIT: I Mod I S SBA CGA Min Mod Max Total  NT x2 Comments:   Level of Assistance [] [] [] [] [] [x] [] [] [] [] [x]    Distance   4 ft bed>chair    DME Gait Belt and close Min A     Gait Quality Decreased step clearance, Decreased step length, and Shuffling     Weightbearing 
Score : 39.45  Mobility Inpatient CMS 0-100% Score: 57.7  Mobility Inpatient CMS G-Code Modifier : CK    SUBJECTIVE:   Ms. Cam sauceda     Social/Functional Type of Home:  (group home)    OBJECTIVE:     PAIN: VITALS / O2: PRECAUTION / LINES / DRAINS:   Pre Treatment: 0/10         Post Treatment: 0/10 Vitals        Oxygen on room air      External Catheter    RESTRICTIONS/PRECAUTIONS:  Restrictions/Precautions: Fall Risk                 GROSS EVALUATION:  Intact Impaired (Comments):   AROM []  Generally decreased, functional    PROM []    Strength []  Generally decreased, functional    Balance []     Posture [] Forward Head  Rounded Shoulders   Sensation []     Coordination []   Generally decreased, functional    Tone [x]     Edema []    Activity Tolerance []      []      COGNITION/  PERCEPTION: Intact Impaired (Comments):   Orientation []  Oriented to self and hospital setting   Vision []     Hearing [x]     Cognition  []  decreased     MOBILITY: I Mod I S SBA CGA Min Mod Max Total  NT x2 Comments:   Bed Mobility    Rolling [] [] [] [] [] [] [] [] [] [] [x]    Supine to Sit [] [] [] [] [] [x] [] [] [] [] [x]    Scooting [] [] [] [] [] [x] [] [] [] [] []    Sit to Supine [] [] [] [] [] [] [] [] [] [] []    Transfers    Sit to Stand [] [] [] [] [] [x] [] [] [] [] [x]     Bed to Chair [] [] [] [] [] [x] [] [] [] [] [x]    Stand to Sit [] [] [] [] [] [x] [] [] [] [] [x]     [] [] [] [] [] [] [] [] [] [] []    I=Independent, Mod I=Modified Independent, S=Supervision, SBA=Standby Assistance, CGA=Contact Guard Assistance,   Min=Minimal Assistance, Mod=Moderate Assistance, Max=Maximal Assistance, Total=Total Assistance, NT=Not Tested    GAIT: I Mod I S SBA CGA Min Mod Max Total  NT x2 Comments:   Level of Assistance [] [] [] [] [] [x] [] [] [] [] []    Distance   4 steps to bed>chair    DME Gait Belt and Rolling Walker    Gait Quality Decreased step clearance, Decreased step length, and Shuffling     Weightbearing Status  
[]      COGNITION/  PERCEPTION: INTACT IMPAIRED   (See Comments)   Orientation []     Vision []     Hearing []     Cognition  []  Follows some  simple directions with addition time and cues   Perception []       MOBILITY: I Mod I S SBA CGA Min Mod Max Total  NT x2 Comments:   Bed Mobility    Rolling [] [] [] [] [] [] [] [] [] [] []    Supine to Sit [] [] [] [] [] [] [] [] [] [] []    Scooting [] [] [] [] [] [] [x] [] [] [] []    Sit to Supine [] [] [] [] [] [x] [] [] [] [] []    Transfers    Sit to Stand [] [] [] [] [] [] [x] [] [] [] []    Bed to Chair [] [] [] [] [] [] [] [] [] [] []    Stand to Sit [] [] [] [] [] [] [x] [] [] [] []    Tub/Shower [] [] [] [] [] [] [] [] [] [] []     Toilet [] [] [] [] [] [] [x] [] [] [] [x]  bsc    [] [] [] [] [] [] [] [] [] [] []    I=Independent, Mod I=Modified Independent, S=Supervision/Setup, SBA=Standby Assistance, CGA=Contact Guard Assistance, Min=Minimal Assistance, Mod=Moderate Assistance, Max=Maximal Assistance, Total=Total Assistance, NT=Not Tested    ACTIVITIES OF DAILY LIVING: I Mod I S SBA CGA Min Mod Max Total NT Comments   BASIC ADLs:              Upper Body Bathing  [] [] [] [] [] [] [] [] [x] []     Lower Body Bathing [] [] [] [] [] [] [] [] [x] []     Toileting [] [] [] [] [] [] [] [] [x] []    Upper Body Dressing [] [] [] [] [] [] [] [] [x] []    Lower Body Dressing [] [] [] [] [] [] [] [] [x] []    Feeding [] [] [] [] [] [] [] [] [x] []    Grooming [] [] [] [] [] [] [] [] [x] []    Personal Device Care [] [] [] [] [] [] [] [] [] []    Functional Mobility [] [] [] [] [] [] [x] [] [] [] Mod x 2 transfer no device   I=Independent, Mod I=Modified Independent, S=Supervision/Setup, SBA=Standby Assistance, CGA=Contact Guard Assistance, Min=Minimal Assistance, Mod=Moderate Assistance, Max=Maximal Assistance, Total=Total Assistance, NT=Not Tested    PLAN:   FREQUENCY/DURATION   OT Plan of Care: 3 times/week for duration of hospital stay or until stated goals are met, 
  I=Independent, Mod I=Modified Independent, S=Supervision/Setup, SBA=Standby Assistance, CGA=Contact Guard Assistance, Min=Minimal Assistance, Mod=Moderate Assistance, Max=Maximal Assistance, Total=Total Assistance, NT=Not Tested    ACTIVITIES OF DAILY LIVING: I Mod I S SBA CGA Min Mod Max Total NT Comments   BASIC ADLs:              Upper Body Bathing  [] [] [] [] [] [] [] [] [] []     Lower Body Bathing [] [] [] [] [] [] [] [] [] []     Toileting [] [] [] [] [] [] [] [] [] []    Upper Body Dressing [] [] [] [] [] [] [] [x] [x] [] Gown change   Lower Body Dressing [] [] [] [] [] [] [] [] [] []    Feeding [] [] [] [] [] [] [] [] [] []    Grooming [] [] [] [] [] [] [] [] [] []    Personal Device Care [] [] [] [] [] [] [] [] [] []    Functional Mobility [] [] [] [] [] [] [] [] [] []    I=Independent, Mod I=Modified Independent, S=Supervision/Setup, SBA=Standby Assistance, CGA=Contact Guard Assistance, Min=Minimal Assistance, Mod=Moderate Assistance, Max=Maximal Assistance, Total=Total Assistance, NT=Not Tested    PLAN:   FREQUENCY/DURATION   OT Plan of Care: 3 times/week for duration of hospital stay or until stated goals are met, whichever comes first.    PROBLEM LIST:   (Skilled intervention is medically necessary to address:)  Decreased ADL/Functional Activities  Decreased Activity Tolerance  Decreased AROM/PROM  Decreased Balance  Decreased Cognition  Decreased Coordination  Decreased Gait Ability  Decreased Safety Awareness  Decreased Strength  Decreased Transfer Abilities   INTERVENTIONS PLANNED:  (Benefits and precautions of occupational therapy have been discussed with the patient.)  Self Care Training  Therapeutic Activity  Therapeutic Exercise/HEP  Neuromuscular Re-education  Manual Therapy  Education         TREATMENT:     TREATMENT:   SELF CARE:  no charge for time         AFTER TREATMENT PRECAUTIONS: Bed/Chair Locked, Call light within reach, Chair, Needs within reach, and RN 
  Result Value Ref Range    POC Glucose 282 (H) 65 - 100 mg/dL    Performed by: Christopher    POCT Glucose    Collection Time: 03/16/24  7:31 AM   Result Value Ref Range    POC Glucose 167 (H) 65 - 100 mg/dL    Performed by: Óscar        No results for input(s): \"COVID19\" in the last 72 hours.    Current Meds:  Current Facility-Administered Medications   Medication Dose Route Frequency    insulin glargine (LANTUS) injection vial 18 Units  18 Units SubCUTAneous Daily    metoprolol succinate (TOPROL XL) extended release tablet 50 mg  50 mg Oral Daily    clonazePAM (KLONOPIN) tablet 1 mg  1 mg Oral Nightly    QUEtiapine (SEROQUEL) tablet 200 mg  200 mg Oral Nightly    divalproex (DEPAKOTE ER) extended release tablet 500 mg  500 mg Oral Nightly    oxyCODONE capsule 5 mg  5 mg Oral Q6H PRN    acetaminophen (TYLENOL) tablet 650 mg  650 mg Oral Q6H PRN    ondansetron (ZOFRAN) injection 4 mg  4 mg IntraVENous Q6H PRN    polyethylene glycol (GLYCOLAX) packet 17 g  17 g Oral Daily PRN    senna (SENOKOT) tablet 17.2 mg  2 tablet Oral Nightly PRN    melatonin tablet 1.5 mg  1.5 mg Oral Nightly PRN    aluminum & magnesium hydroxide-simethicone (MAALOX) 200-200-20 MG/5ML suspension 30 mL  30 mL Oral Q6H PRN    traZODone (DESYREL) tablet 50 mg  50 mg Oral Nightly PRN    ondansetron (ZOFRAN-ODT) disintegrating tablet 4 mg  4 mg Oral Q8H PRN    insulin lispro (HUMALOG) injection vial 0-8 Units  0-8 Units SubCUTAneous TID WC    insulin lispro (HUMALOG) injection vial 0-4 Units  0-4 Units SubCUTAneous Nightly    glucose chewable tablet 16 g  4 tablet Oral PRN    dextrose bolus 10% 125 mL  125 mL IntraVENous PRN    Or    dextrose bolus 10% 250 mL  250 mL IntraVENous PRN    Glucagon Emergency KIT 1 mg  1 mg SubCUTAneous PRN    potassium chloride (KLOR-CON M) extended release tablet 40 mEq  40 mEq Oral PRN    Or    potassium bicarb-citric acid (EFFER-K) effervescent tablet 40 mEq  40 mEq Oral PRN    Or    potassium 
03/05/24 11:09 AM   Result Value Ref Range    POC Glucose 177 (H) 65 - 100 mg/dL    Performed by: Moira        No results for input(s): \"COVID19\" in the last 72 hours.    Current Meds:  Current Facility-Administered Medications   Medication Dose Route Frequency    HYDROmorphone (DILAUDID) injection 0.25 mg  0.25 mg IntraVENous Q4H PRN    oxyCODONE (ROXICODONE) immediate release tablet 5 mg  5 mg Oral Q6H PRN    acetaminophen (TYLENOL) tablet 650 mg  650 mg Oral Q6H PRN    ondansetron (ZOFRAN) injection 4 mg  4 mg IntraVENous Q6H PRN    polyethylene glycol (GLYCOLAX) packet 17 g  17 g Oral Daily PRN    senna (SENOKOT) tablet 17.2 mg  2 tablet Oral Nightly PRN    melatonin tablet 1.5 mg  1.5 mg Oral Nightly PRN    aluminum & magnesium hydroxide-simethicone (MAALOX) 200-200-20 MG/5ML suspension 30 mL  30 mL Oral Q6H PRN    traZODone (DESYREL) tablet 50 mg  50 mg Oral Nightly PRN    ondansetron (ZOFRAN-ODT) disintegrating tablet 4 mg  4 mg Oral Q8H PRN    LORazepam (ATIVAN) tablet 0.5 mg  0.5 mg Oral Q6H PRN    insulin lispro (HUMALOG) injection vial 0-8 Units  0-8 Units SubCUTAneous TID WC    insulin lispro (HUMALOG) injection vial 0-4 Units  0-4 Units SubCUTAneous Nightly    glucose chewable tablet 16 g  4 tablet Oral PRN    dextrose bolus 10% 125 mL  125 mL IntraVENous PRN    Or    dextrose bolus 10% 250 mL  250 mL IntraVENous PRN    Glucagon Emergency KIT 1 mg  1 mg SubCUTAneous PRN    potassium chloride (KLOR-CON M) extended release tablet 40 mEq  40 mEq Oral PRN    Or    potassium bicarb-citric acid (EFFER-K) effervescent tablet 40 mEq  40 mEq Oral PRN    Or    potassium chloride 10 mEq/100 mL IVPB (Peripheral Line)  10 mEq IntraVENous PRN    magnesium sulfate 2000 mg in 50 mL IVPB premix  2,000 mg IntraVENous PRN    enoxaparin (LOVENOX) injection 40 mg  40 mg SubCUTAneous Daily    amantadine (SYMMETREL) capsule 100 mg  100 mg Oral BID    clonazePAM (KLONOPIN) tablet 1 mg  1 mg Oral BID    
100 mg/dL    Performed by: Óscar    POCT Glucose    Collection Time: 03/16/24 12:16 PM   Result Value Ref Range    POC Glucose 286 (H) 65 - 100 mg/dL    Performed by: [place]    POCT Glucose    Collection Time: 03/16/24  5:05 PM   Result Value Ref Range    POC Glucose 388 (H) 65 - 100 mg/dL    Performed by: [place]    POCT Glucose    Collection Time: 03/16/24  8:06 PM   Result Value Ref Range    POC Glucose 376 (H) 65 - 100 mg/dL    Performed by: Griffin    POCT Glucose    Collection Time: 03/16/24 10:00 PM   Result Value Ref Range    POC Glucose 304 (H) 65 - 100 mg/dL    Performed by: Griffin    POCT Glucose    Collection Time: 03/16/24 10:59 PM   Result Value Ref Range    POC Glucose 310 (H) 65 - 100 mg/dL    Performed by: Griffin    Troponin    Collection Time: 03/16/24 11:32 PM   Result Value Ref Range    Troponin, High Sensitivity 4.8 0 - 14 pg/mL   POCT Glucose    Collection Time: 03/17/24 12:59 AM   Result Value Ref Range    POC Glucose 184 (H) 65 - 100 mg/dL    Performed by: Carolina    Troponin    Collection Time: 03/17/24  1:32 AM   Result Value Ref Range    Troponin, High Sensitivity 4.7 0 - 14 pg/mL   Troponin    Collection Time: 03/17/24  3:39 AM   Result Value Ref Range    Troponin, High Sensitivity 4.2 0 - 14 pg/mL   POCT Glucose    Collection Time: 03/17/24  7:46 AM   Result Value Ref Range    POC Glucose 183 (H) 65 - 100 mg/dL    Performed by: Wilfredo([facility]        No results for input(s): \"COVID19\" in the last 72 hours.    Current Meds:  Current Facility-Administered Medications   Medication Dose Route Frequency    metoprolol succinate (TOPROL XL) extended release tablet 100 mg  100 mg Oral Daily    insulin glargine (LANTUS) injection vial 18 Units  18 Units SubCUTAneous Daily    clonazePAM (KLONOPIN) tablet 1 mg  1 mg Oral Nightly    QUEtiapine (SEROQUEL) tablet 200 mg  200 mg Oral Nightly    divalproex (DEPAKOTE ER) extended 
Assistance, Max=Maximal Assistance, Total=Total Assistance, NT=Not Tested    GAIT: I Mod I S SBA CGA Min Mod Max Total  NT x2 Comments:   Level of Assistance [] [] [] [] [] [x] [] [] [] [] []    Distance   Bed>chair    DME Gait Belt and Rolling Walker    Gait Quality Decreased step clearance, Decreased step length, and Shuffling     Weightbearing Status      Stairs      I=Independent, Mod I=Modified Independent, S=Supervision, SBA=Standby Assistance, CGA=Contact Guard Assistance,   Min=Minimal Assistance, Mod=Moderate Assistance, Max=Maximal Assistance, Total=Total Assistance, NT=Not Tested    PLAN:   FREQUENCY AND DURATION: Daily for duration of hospital stay or until stated goals are met, whichever comes first.    THERAPY PROGNOSIS: Good    PROBLEM LIST:   (Skilled intervention is medically necessary to address:)  Decreased ADL/Functional Activities  Decreased Activity Tolerance  Decreased AROM/PROM  Decreased Balance  Decreased Cognition  Decreased Coordination  Decreased Gait Ability  Decreased Safety Awareness  Decreased Strength  Decreased Transfer Abilities INTERVENTIONS PLANNED:   (Benefits and precautions of physical therapy have been discussed with the patient.)  Therapeutic Activity  Therapeutic Exercise/HEP  Gait Training  Education       TREATMENT:       TREATMENT:   Therapeutic Activity (40 Minutes): Therapeutic activity included Supine to Sit, Scooting, Transfer Training, Ambulation on level ground, Sitting balance , and Standing balance to improve functional Activity tolerance, Balance, Coordination, Mobility, Strength, and ROM.      TREATMENT GRID:  All B MARIA R tactile cues, demonstration Date:   Date:  3/11     Date:  3/12     Date:  3/13   Date :     Activity/Exercise Parameters Parameters Parameters     Ankle pumps 15 12 12 12    LAQ 15 12  12    Hip abd/ad 15 12 12 12    SAQ 15 12 12 12    Heel slides   12 12                            AFTER TREATMENT PRECAUTIONS: Bed/Chair Locked, Call light 
Glucose    Collection Time: 03/06/24  4:19 PM   Result Value Ref Range    POC Glucose 103 (H) 65 - 100 mg/dL    Performed by: Susi        No results for input(s): \"COVID19\" in the last 72 hours.    Current Meds:  Current Facility-Administered Medications   Medication Dose Route Frequency    HYDROmorphone (DILAUDID) injection 0.25 mg  0.25 mg IntraVENous Q4H PRN    oxyCODONE (ROXICODONE) immediate release tablet 5 mg  5 mg Oral Q6H PRN    acetaminophen (TYLENOL) tablet 650 mg  650 mg Oral Q6H PRN    ondansetron (ZOFRAN) injection 4 mg  4 mg IntraVENous Q6H PRN    polyethylene glycol (GLYCOLAX) packet 17 g  17 g Oral Daily PRN    senna (SENOKOT) tablet 17.2 mg  2 tablet Oral Nightly PRN    melatonin tablet 1.5 mg  1.5 mg Oral Nightly PRN    aluminum & magnesium hydroxide-simethicone (MAALOX) 200-200-20 MG/5ML suspension 30 mL  30 mL Oral Q6H PRN    traZODone (DESYREL) tablet 50 mg  50 mg Oral Nightly PRN    ondansetron (ZOFRAN-ODT) disintegrating tablet 4 mg  4 mg Oral Q8H PRN    LORazepam (ATIVAN) tablet 0.5 mg  0.5 mg Oral Q6H PRN    insulin lispro (HUMALOG) injection vial 0-8 Units  0-8 Units SubCUTAneous TID WC    insulin lispro (HUMALOG) injection vial 0-4 Units  0-4 Units SubCUTAneous Nightly    glucose chewable tablet 16 g  4 tablet Oral PRN    dextrose bolus 10% 125 mL  125 mL IntraVENous PRN    Or    dextrose bolus 10% 250 mL  250 mL IntraVENous PRN    Glucagon Emergency KIT 1 mg  1 mg SubCUTAneous PRN    potassium chloride (KLOR-CON M) extended release tablet 40 mEq  40 mEq Oral PRN    Or    potassium bicarb-citric acid (EFFER-K) effervescent tablet 40 mEq  40 mEq Oral PRN    Or    potassium chloride 10 mEq/100 mL IVPB (Peripheral Line)  10 mEq IntraVENous PRN    magnesium sulfate 2000 mg in 50 mL IVPB premix  2,000 mg IntraVENous PRN    enoxaparin (LOVENOX) injection 40 mg  40 mg SubCUTAneous Daily    amantadine (SYMMETREL) capsule 100 mg  100 mg Oral BID    clonazePAM (KLONOPIN) tablet 1 mg  1 
MG/DL    Creatinine 0.80 0.6 - 1.0 MG/DL    Est, Glom Filt Rate >60 >60 ml/min/1.73m2    Calcium 10.0 8.3 - 10.4 MG/DL   POCT Glucose    Collection Time: 03/09/24  8:15 AM   Result Value Ref Range    POC Glucose 194 (H) 65 - 100 mg/dL    Performed by: NailanaorKariPCT    POCT Glucose    Collection Time: 03/09/24 11:31 AM   Result Value Ref Range    POC Glucose 166 (H) 65 - 100 mg/dL    Performed by: NaylorKariPCT        No results for input(s): \"COVID19\" in the last 72 hours.    Current Meds:  Current Facility-Administered Medications   Medication Dose Route Frequency    clonazePAM (KLONOPIN) tablet 1 mg  1 mg Oral BID    oxyCODONE (ROXICODONE) immediate release tablet 5 mg  5 mg Oral Q6H PRN    acetaminophen (TYLENOL) tablet 650 mg  650 mg Oral Q6H PRN    ondansetron (ZOFRAN) injection 4 mg  4 mg IntraVENous Q6H PRN    polyethylene glycol (GLYCOLAX) packet 17 g  17 g Oral Daily PRN    senna (SENOKOT) tablet 17.2 mg  2 tablet Oral Nightly PRN    melatonin tablet 1.5 mg  1.5 mg Oral Nightly PRN    aluminum & magnesium hydroxide-simethicone (MAALOX) 200-200-20 MG/5ML suspension 30 mL  30 mL Oral Q6H PRN    traZODone (DESYREL) tablet 50 mg  50 mg Oral Nightly PRN    ondansetron (ZOFRAN-ODT) disintegrating tablet 4 mg  4 mg Oral Q8H PRN    insulin lispro (HUMALOG) injection vial 0-8 Units  0-8 Units SubCUTAneous TID WC    insulin lispro (HUMALOG) injection vial 0-4 Units  0-4 Units SubCUTAneous Nightly    glucose chewable tablet 16 g  4 tablet Oral PRN    dextrose bolus 10% 125 mL  125 mL IntraVENous PRN    Or    dextrose bolus 10% 250 mL  250 mL IntraVENous PRN    Glucagon Emergency KIT 1 mg  1 mg SubCUTAneous PRN    potassium chloride (KLOR-CON M) extended release tablet 40 mEq  40 mEq Oral PRN    Or    potassium bicarb-citric acid (EFFER-K) effervescent tablet 40 mEq  40 mEq Oral PRN    Or    potassium chloride 10 mEq/100 mL IVPB (Peripheral Line)  10 mEq IntraVENous PRN    magnesium sulfate 2000 mg in 50 mL IVPB premix 
Stand [] [] [] [] [] [x] [] [] [] [] []    Bed to Chair [] [] [] [] [] [x] [] [] [] [] []    Stand to Sit [] [] [] [] [] [x] [] [] [] [] []     [] [] [] [] [] [] [] [] [] [] []    I=Independent, Mod I=Modified Independent, S=Supervision, SBA=Standby Assistance, CGA=Contact Guard Assistance,   Min=Minimal Assistance, Mod=Moderate Assistance, Max=Maximal Assistance, Total=Total Assistance, NT=Not Tested    GAIT: I Mod I S SBA CGA Min Mod Max Total  NT x2 Comments:   Level of Assistance [] [] [] [] [] [] [] [] [] [] [] Knees \"buckly\" when she fatigues and she needs to sit quickly   Distance   4 ft bed>chair    DME Gait Belt and close Min A     Gait Quality Decreased step clearance, Decreased step length, and Shuffling     Weightbearing Status      Stairs      I=Independent, Mod I=Modified Independent, S=Supervision, SBA=Standby Assistance, CGA=Contact Guard Assistance,   Min=Minimal Assistance, Mod=Moderate Assistance, Max=Maximal Assistance, Total=Total Assistance, NT=Not Tested    PLAN:   FREQUENCY AND DURATION: Daily for duration of hospital stay or until stated goals are met, whichever comes first.    THERAPY PROGNOSIS: Good    PROBLEM LIST:   (Skilled intervention is medically necessary to address:)  Decreased ADL/Functional Activities  Decreased Activity Tolerance  Decreased AROM/PROM  Decreased Balance  Decreased Cognition  Decreased Coordination  Decreased Gait Ability  Decreased Safety Awareness  Decreased Strength  Decreased Transfer Abilities INTERVENTIONS PLANNED:   (Benefits and precautions of physical therapy have been discussed with the patient.)  Therapeutic Activity  Therapeutic Exercise/HEP  Gait Training  Education       TREATMENT:   EVALUATION: LOW COMPLEXITY: (Untimed Charge)    TREATMENT:   Therapeutic Activity (40 Minutes): Therapeutic activity included Supine to Sit, Sit to Supine, Transfer Training, Ambulation on level ground, Sitting balance , and Standing balance to improve functional 
clearance, Decreased step length, and Trunk flexion    Weightbearing Status      Stairs      I=Independent, Mod I=Modified Independent, S=Supervision, SBA=Standby Assistance, CGA=Contact Guard Assistance,   Min=Minimal Assistance, Mod=Moderate Assistance, Max=Maximal Assistance, Total=Total Assistance, NT=Not Tested    PLAN:   FREQUENCY AND DURATION: Daily for duration of hospital stay or until stated goals are met, whichever comes first.    THERAPY PROGNOSIS: Good    PROBLEM LIST:   (Skilled intervention is medically necessary to address:)  Decreased ADL/Functional Activities  Decreased Activity Tolerance  Decreased AROM/PROM  Decreased Balance  Decreased Cognition  Decreased Coordination  Decreased Gait Ability  Decreased Safety Awareness  Decreased Strength  Decreased Transfer Abilities INTERVENTIONS PLANNED:   (Benefits and precautions of physical therapy have been discussed with the patient.)  Therapeutic Activity  Therapeutic Exercise/HEP  Gait Training  Education       TREATMENT:       TREATMENT:   Therapeutic Activity (65 Minutes): Therapeutic activity included Supine to Sit, Sit to Supine, Scooting, Transfer Training, Ambulation on level ground, Sitting balance , Standing balance, and toilet transfer and shower/dressing to improve functional Activity tolerance, Balance, Coordination, Mobility, Strength, and ROM.      TREATMENT GRID:  All B AA tactile cues, demonstration Date:   Date:  3/11     Date:  3/12       Activity/Exercise Parameters Parameters Parameters   Ankle pumps 15 12 12   LAQ 15 12    Hip abd/ad 15 12 12   SAQ 15 12 12   Heel slides   12                       AFTER TREATMENT PRECAUTIONS: Alarm Activated, Bed, Bed/Chair Locked, Call light within reach, Needs within reach, and RN notified    INTERDISCIPLINARY COLLABORATION:  RN/ PCT and OT/ BRITO    EDUCATION: Education Given To: Patient  Education Provided: Role of Therapy;Plan of Care;Equipment  Education Method: Verbal  Education Outcome: 
step length, and Shuffling     Weightbearing Status      Stairs      I=Independent, Mod I=Modified Independent, S=Supervision, SBA=Standby Assistance, CGA=Contact Guard Assistance,   Min=Minimal Assistance, Mod=Moderate Assistance, Max=Maximal Assistance, Total=Total Assistance, NT=Not Tested    PLAN:   FREQUENCY AND DURATION: Daily for duration of hospital stay or until stated goals are met, whichever comes first.    THERAPY PROGNOSIS: Good    PROBLEM LIST:   (Skilled intervention is medically necessary to address:)  Decreased ADL/Functional Activities  Decreased Activity Tolerance  Decreased AROM/PROM  Decreased Balance  Decreased Cognition  Decreased Coordination  Decreased Gait Ability  Decreased Safety Awareness  Decreased Strength  Decreased Transfer Abilities INTERVENTIONS PLANNED:   (Benefits and precautions of physical therapy have been discussed with the patient.)  Therapeutic Activity  Therapeutic Exercise/HEP  Gait Training  Education       TREATMENT:       TREATMENT:   Therapeutic Activity (42 Minutes): Therapeutic activity included Supine to Sit, Transfer Training, Ambulation on level ground, Sitting balance , and Standing balance to improve functional Activity tolerance, Balance, Coordination, Mobility, Strength, and ROM.      TREATMENT GRID:  All B AA tactile cues, demonstration Date:   Date:  3/11     Date:  3/12     Date:  3/13   Date :  3/14     Activity/Exercise Parameters Parameters Parameters     Ankle pumps 15 12 12 12 15   LAQ 15 12  12 15   Hip abd/ad 15 12 12 12 15   SAQ 15 12 12 12 15   Heel slides   12 12 15                           AFTER TREATMENT PRECAUTIONS: Bed/Chair Locked, Call light within reach, Chair, Needs within reach, and RN notified    INTERDISCIPLINARY COLLABORATION:  RN/ PCT and PT/ PTA    EDUCATION:      TIME IN/OUT:  Time In: 0700  Time Out: 0742  Minutes: 42    CELIA DAI PTA   
   potassium chloride (KLOR-CON M) extended release tablet 40 mEq  40 mEq Oral PRN    Or    potassium bicarb-citric acid (EFFER-K) effervescent tablet 40 mEq  40 mEq Oral PRN    Or    potassium chloride 10 mEq/100 mL IVPB (Peripheral Line)  10 mEq IntraVENous PRN    magnesium sulfate 2000 mg in 50 mL IVPB premix  2,000 mg IntraVENous PRN    enoxaparin (LOVENOX) injection 40 mg  40 mg SubCUTAneous Daily    amantadine (SYMMETREL) capsule 100 mg  100 mg Oral BID    famotidine (PEPCID) tablet 20 mg  20 mg Oral BID    metoprolol succinate (TOPROL XL) extended release tablet 25 mg  25 mg Oral Daily    pantoprazole (PROTONIX) tablet 40 mg  40 mg Oral QAM AC    rosuvastatin (CRESTOR) tablet 10 mg  10 mg Oral Nightly    insulin glargine (LANTUS) injection vial 10 Units  10 Units SubCUTAneous Daily       Signed:  Kenyatta Hill MD    Part of this note may have been written by using a voice dictation software.  The note has been proof read but may still contain some grammatical/other typographical errors.  
Activity Tolerance  Decreased AROM/PROM  Decreased Balance  Decreased Cognition  Decreased Coordination  Decreased Gait Ability  Decreased Safety Awareness  Decreased Strength  Decreased Transfer Abilities INTERVENTIONS PLANNED:   (Benefits and precautions of physical therapy have been discussed with the patient.)  Therapeutic Activity  Therapeutic Exercise/HEP  Gait Training  Education       TREATMENT:   EVALUATION: LOW COMPLEXITY: (Untimed Charge)    TREATMENT:   Therapeutic Activity (30 Minutes): Therapeutic activity included Sit to Supine, Transfer Training, Ambulation on level ground, Sitting balance , and Standing balance to improve functional Activity tolerance, Balance, Coordination, Mobility, Strength, and ROM.    TREATMENT GRID:  All B AA tactile cues, demonstration Date:  3/4 Date:  3/5   Date:  3/6     Activity/Exercise Parameters Parameters Parameters   Ankle pumps 12 12 10   Heel slides 12 12 10   Hip abd/ad 12 12 10   SAQ 12 12 10                             AFTER TREATMENT PRECAUTIONS: Bed/Chair Locked, Call light within reach, Chair, and Needs within reach    INTERDISCIPLINARY COLLABORATION:  RN/ PCT    EDUCATION:      TIME IN/OUT:  Time In: 1430  Time Out: 1508  Minutes: 38    CELIA DAI, PTA   
activity, and adl tasks and B UE exercises with moderate verbal cues in order to increase independence and increase activity tolerance.      Date:  3-19 Date:   Date:     Activity/Exercise Parameters Parameters Parameters   Shoulder flex/ext 10 reps     Elbow flex/ext 10     Wrist flex/ ext 10     Arm punches  10     Supination/ pronation 10                    AFTER TREATMENT PRECAUTIONS: Bed, Bed/Chair Locked, Call light within reach, Needs within reach, and RN notified    INTERDISCIPLINARY COLLABORATION:  RN/ PCT and PT/ PTA    EDUCATION:  Education Given To: Patient  Education Provided: Plan of Care  Education Method: Demonstration  Barriers to Learning: Cognition  Education Outcome: Demonstrated understanding    TOTAL TREATMENT DURATION AND TIME:  Time In: 0800  Time Out: 0825  Minutes: 25    LETY AYON, OT               
mg Oral BID    divalproex (DEPAKOTE) DR tablet 750 mg  750 mg Oral BID    famotidine (PEPCID) tablet 20 mg  20 mg Oral BID    metoprolol succinate (TOPROL XL) extended release tablet 25 mg  25 mg Oral Daily    pantoprazole (PROTONIX) tablet 40 mg  40 mg Oral QAM AC    QUEtiapine (SEROQUEL) tablet 400 mg  400 mg Oral BID    risperiDONE (RISPERDAL) tablet 0.5 mg  0.5 mg Oral BID    rosuvastatin (CRESTOR) tablet 10 mg  10 mg Oral Nightly    insulin glargine (LANTUS) injection vial 10 Units  10 Units SubCUTAneous Daily       Signed:  Leonidas Glover DO    Part of this note may have been written by using a voice dictation software.  The note has been proof read but may still contain some grammatical/other typographical errors.  
verbal, and tactile cueing to increase independence, increase activity tolerance, and increase safety awareness. Patient also participated in functional mobility, functional transfer, and adaptive equipment training to increase independence, increase activity tolerance, and increase safety awareness.     AFTER TREATMENT PRECAUTIONS: Bed, Bed/Chair Locked, Call light within reach, Needs within reach, and RN notified    INTERDISCIPLINARY COLLABORATION:  RN/ PCT and PT/ PTA    EDUCATION:  Education Given To: Patient  Education Provided: Role of Therapy;ADL Adaptive Strategies;Transfer Training;Equipment;Fall Prevention Strategies  Education Method: Demonstration;Verbal  Barriers to Learning: Cognition  Education Outcome: Demonstrated understanding;Continued education needed    TOTAL TREATMENT DURATION AND TIME:  Time In: 1415  Time Out: 1520  Minutes: 65    Carolina Harris OT

## 2024-03-19 NOTE — CARE COORDINATION
Pt is for discharge today to [facility] group home as planned and [facility] PT/OT/RN services.  VINNY provided pt with DME of RW from [email or web address] via [facility] around 1100. VINNY sent prescriptions to [facility] via Fax.     Packet prepared to go with pt to facility.  Spoke with Shivani at group home by phone with update on anticipated transport time and services after discharge.             03/19/24 0942   Service Assessment   Patient's Healthcare Decision Maker is: Legal Next of Kin   Services At/After Discharge   Transition of Care Consult (CM Consult) Home Health;Transportation Assistance;Discharge Planning;Group Home;DME/Supply Assistance   Internal Home Health Yes   Services At/After Discharge OT;PT;Nursing services;In ambulance;Transport;Group home;DME  (DME of RW ([facility]))   [place] Resource Information Provided? No   Mode of Transport at Discharge [facility] Transport Time of Discharge 1100   Confirm Follow Up Transport Other (see comment)  ([employer])   Condition of Participation: Discharge Planning   The Plan for Transition of Care is related to the following treatment goals: Patient to return to her group home via [employer] with  services.   The Patient and/or Patient Representative was provided with a Choice of Provider? Patient   The Patient and/Or Patient Representative agree with the Discharge Plan? Yes   Freedom of Choice list was provided with basic dialogue that supports the patient's individualized plan of care/goals, treatment preferences, and shares the quality data associated with the providers?  Yes       Geri Gill, MSW, LBSW    [facility]

## 2024-03-19 NOTE — CARE COORDINATION
VINNY notified of [employer] returning pt back to pt's room due to [facility] reporting they did not know pt and were not notified of pt returning to group home.     VINNY contacted Shivani Jara (director of group [place]) at [phone] to discuss [employer] bringing patient back to hospital as group home did not know the pt or know pt was to return today. Shivani merged call with [facility] where [facility] resides. Alexa reported case management and RN did not communicate discharge plan. VINNY spoke with both Shivani and Alexa that pt was assessed yesterday by Aby Valle who approved pt to return to group home with PT/OT, walker, and transport. Alexa asked SW why CM or RN did not communicate with her of discharge plan. VINNY discussed informing Shivani at 9:24am today of pt's plan to return to group home to ensure group home was able to accept pt today and put discharge plan in place. VINNY informed Alexa that pt was approved to return today by RN that assessed yesterday. VINNY discussed multiple numbers and contacts for Group Home and VINNY contacted number in previous note (Shivani) to notify of discharge plan and time of transport.     VINNY asked if pt could be transported to facility today at 1430 as pt medically stable for discharge with active discharge order. Shivani reported VINNY could schedule transport but would have to ensure additional care would be available for pt. VINNY reported to Shivani, VINNY would like to ensure pt can return with appropriate care prior to scheduling transport. Shivani verified VINNY's phone number to contact regarding pt's return to group home today and stated plans to contact VINNY.     Addendum 14:13: VINNY contacted Shivani Jara at [phone] to check on status of pt being able to return to group home today. Shivani reported pt able to return to group home via EMS transport at 1830. Shivani stated Alexa will be at group home to accept pt. VINNY verified with Shivani that Alexa was aware

## 2024-03-19 NOTE — DISCHARGE SUMMARY
Hospitalist Discharge Summary   Admit Date:  3/2/2024 10:40 AM   DC Note date: 3/19/2024  Name:  Kenia Levy   Age:  53 y.o.  Sex:  female  :  1970   MRN:  [id number]   Room:  [facility]  PCP:  Shari Jeter MD    Presenting Complaint: Altered Mental Status     Initial Admission Diagnosis: Generalized weakness [R53.1]  Altered mental status, unspecified altered mental status type [R41.82]  COVID [U07.1]     Problem List for this Hospitalization (present on admission):    Principal Problem:    Generalized weakness  Active Problems:    Altered mental status    Schizoaffective disorder, bipolar type ([facility])  Resolved Problems:    * No resolved hospital problems. *      Hospital Course:  Please refer to the admission H&P for details of presentation. In summary, Kenia Levy is a 53 y.o. female with past medical history significant for schizoaffective disorder, T2DM  presents from her group home 3/2 with lethargy, decreased mental status and found to be COVID-19 positive.     Patient's psych meds were initially held due to her mental status. Pt became more alert off of her psych meds. Psychiatry was consulted and her medications were adjusted. Patient is now more alert and awake.   Hospitalization was prolonged due to patient's weakness and debility requiring PT/OT .      Patient is medically stable for discharge. Patient is to continue taking medications as prescribed and to follow up with PCP on discharge.  Patient is instructed to to call a physician or return to ED if any concerns/symptoms worsened.   Discharge summary and encounter summary was sent to PCP electronically via \"Comm [facility]\" link in [facility], if possible.      Disposition:  Group Home  Diet: ADULT ORAL NUTRITION SUPPLEMENT; Breakfast, Lunch, Dinner; Diabetic Oral Supplement  ADULT DIET; Regular; 5 carb choices (75 gm/meal); [employer] foods. Márquez and egg sandwich at breakfast, sandwich or chicken fingers and fries at lunch and dinner  Code

## 2024-03-20 NOTE — CARE COORDINATION
SW contacted by pt's pharmacy Ryan Nguyen regarding quantity of medication. SW reviewed pt's AVS and verified medication quantity.

## 2024-03-21 ENCOUNTER — HOME CARE VISIT (OUTPATIENT)
Dept: SCHEDULING | Facility: HOME HEALTH | Age: 54
End: 2024-03-21
Payer: MEDICAID

## 2024-03-21 VITALS
RESPIRATION RATE: 16 BRPM | TEMPERATURE: 97.7 F | DIASTOLIC BLOOD PRESSURE: 70 MMHG | HEART RATE: 84 BPM | OXYGEN SATURATION: 98 % | SYSTOLIC BLOOD PRESSURE: 112 MMHG

## 2024-03-21 VITALS
TEMPERATURE: 97.2 F | SYSTOLIC BLOOD PRESSURE: 122 MMHG | HEART RATE: 99 BPM | RESPIRATION RATE: 16 BRPM | OXYGEN SATURATION: 99 % | DIASTOLIC BLOOD PRESSURE: 70 MMHG

## 2024-03-21 PROCEDURE — G0152 HHCP-SERV OF OT,EA 15 MIN: HCPCS

## 2024-03-21 PROCEDURE — G0299 HHS/HOSPICE OF RN EA 15 MIN: HCPCS

## 2024-03-21 ASSESSMENT — ENCOUNTER SYMPTOMS
DOUBLE VISION: 0
PAIN LOCATION - PAIN QUALITY: SORE, ACHEY
DYSPNEA ACTIVITY LEVEL: AFTER AMBULATING LESS THAN 10 FT
CONSTIPATION: 1

## 2024-03-22 ENCOUNTER — HOME CARE VISIT (OUTPATIENT)
Dept: HOME HEALTH SERVICES | Facility: HOME HEALTH | Age: 54
End: 2024-03-22
Payer: MEDICAID

## 2024-03-25 ENCOUNTER — HOME CARE VISIT (OUTPATIENT)
Dept: SCHEDULING | Facility: HOME HEALTH | Age: 54
End: 2024-03-25
Payer: MEDICAID

## 2024-03-25 VITALS
TEMPERATURE: 97.4 F | OXYGEN SATURATION: 96 % | RESPIRATION RATE: 16 BRPM | HEART RATE: 96 BPM | SYSTOLIC BLOOD PRESSURE: 110 MMHG | DIASTOLIC BLOOD PRESSURE: 66 MMHG

## 2024-03-25 PROCEDURE — G0151 HHCP-SERV OF PT,EA 15 MIN: HCPCS

## 2024-03-25 ASSESSMENT — ENCOUNTER SYMPTOMS
COUGH CHARACTERISTICS: PRODUCTIVE
DYSPNEA ACTIVITY LEVEL: AFTER AMBULATING MORE THAN 20 FT
SPUTUM CONSISTENCY: THICK
PAIN LOCATION - PAIN QUALITY: ACHE
COUGH: 1
SPUTUM PRODUCTION: 1
SPUTUM COLOR: GREEN
SPUTUM AMOUNT: SCANT

## 2024-03-26 ENCOUNTER — HOME CARE VISIT (OUTPATIENT)
Dept: SCHEDULING | Facility: HOME HEALTH | Age: 54
End: 2024-03-26
Payer: MEDICAID

## 2024-03-26 VITALS
TEMPERATURE: 97.8 F | DIASTOLIC BLOOD PRESSURE: 64 MMHG | RESPIRATION RATE: 16 BRPM | SYSTOLIC BLOOD PRESSURE: 100 MMHG | HEART RATE: 99 BPM | OXYGEN SATURATION: 99 %

## 2024-03-26 PROCEDURE — G0299 HHS/HOSPICE OF RN EA 15 MIN: HCPCS

## 2024-03-27 ENCOUNTER — HOME CARE VISIT (OUTPATIENT)
Dept: SCHEDULING | Facility: HOME HEALTH | Age: 54
End: 2024-03-27
Payer: MEDICAID

## 2024-03-27 VITALS
RESPIRATION RATE: 16 BRPM | HEART RATE: 82 BPM | DIASTOLIC BLOOD PRESSURE: 78 MMHG | TEMPERATURE: 98.2 F | SYSTOLIC BLOOD PRESSURE: 110 MMHG | OXYGEN SATURATION: 96 %

## 2024-03-27 PROCEDURE — G0158 HHC OT ASSISTANT EA 15: HCPCS

## 2024-03-28 ENCOUNTER — HOME CARE VISIT (OUTPATIENT)
Dept: SCHEDULING | Facility: HOME HEALTH | Age: 54
End: 2024-03-28
Payer: MEDICAID

## 2024-03-28 ENCOUNTER — HOME CARE VISIT (OUTPATIENT)
Dept: HOME HEALTH SERVICES | Facility: HOME HEALTH | Age: 54
End: 2024-03-28
Payer: MEDICAID

## 2024-03-29 ENCOUNTER — HOME CARE VISIT (OUTPATIENT)
Dept: SCHEDULING | Facility: HOME HEALTH | Age: 54
End: 2024-03-29
Payer: MEDICAID

## 2024-03-29 VITALS
HEART RATE: 68 BPM | DIASTOLIC BLOOD PRESSURE: 68 MMHG | SYSTOLIC BLOOD PRESSURE: 118 MMHG | RESPIRATION RATE: 16 BRPM | TEMPERATURE: 97.8 F | OXYGEN SATURATION: 98 %

## 2024-03-29 PROCEDURE — G0158 HHC OT ASSISTANT EA 15: HCPCS

## 2024-04-01 ENCOUNTER — HOME CARE VISIT (OUTPATIENT)
Dept: SCHEDULING | Facility: HOME HEALTH | Age: 54
End: 2024-04-01
Payer: MEDICAID

## 2024-04-01 VITALS
SYSTOLIC BLOOD PRESSURE: 108 MMHG | HEART RATE: 84 BPM | OXYGEN SATURATION: 95 % | RESPIRATION RATE: 16 BRPM | TEMPERATURE: 97.3 F | DIASTOLIC BLOOD PRESSURE: 62 MMHG

## 2024-04-01 PROCEDURE — G0299 HHS/HOSPICE OF RN EA 15 MIN: HCPCS

## 2024-04-01 PROCEDURE — G0158 HHC OT ASSISTANT EA 15: HCPCS

## 2024-04-01 ASSESSMENT — ENCOUNTER SYMPTOMS: STOOL DESCRIPTION: SOFT FORMED

## 2024-04-04 ENCOUNTER — HOME CARE VISIT (OUTPATIENT)
Dept: SCHEDULING | Facility: HOME HEALTH | Age: 54
End: 2024-04-04
Payer: MEDICAID

## 2024-04-04 VITALS
SYSTOLIC BLOOD PRESSURE: 116 MMHG | TEMPERATURE: 98.4 F | DIASTOLIC BLOOD PRESSURE: 72 MMHG | OXYGEN SATURATION: 100 % | RESPIRATION RATE: 16 BRPM | HEART RATE: 68 BPM

## 2024-04-04 PROCEDURE — G0158 HHC OT ASSISTANT EA 15: HCPCS

## 2024-04-05 ENCOUNTER — HOME CARE VISIT (OUTPATIENT)
Dept: SCHEDULING | Facility: HOME HEALTH | Age: 54
End: 2024-04-05
Payer: MEDICAID

## 2024-04-05 VITALS
SYSTOLIC BLOOD PRESSURE: 126 MMHG | TEMPERATURE: 98 F | DIASTOLIC BLOOD PRESSURE: 62 MMHG | HEART RATE: 78 BPM | RESPIRATION RATE: 16 BRPM | OXYGEN SATURATION: 99 %

## 2024-04-05 PROCEDURE — G0299 HHS/HOSPICE OF RN EA 15 MIN: HCPCS

## 2024-04-05 ASSESSMENT — ENCOUNTER SYMPTOMS: STOOL DESCRIPTION: SOFT FORMED

## 2024-04-10 ENCOUNTER — HOME CARE VISIT (OUTPATIENT)
Dept: SCHEDULING | Facility: HOME HEALTH | Age: 54
End: 2024-04-10
Payer: MEDICAID

## 2024-04-10 VITALS
SYSTOLIC BLOOD PRESSURE: 126 MMHG | TEMPERATURE: 98.7 F | HEART RATE: 68 BPM | RESPIRATION RATE: 16 BRPM | OXYGEN SATURATION: 98 % | DIASTOLIC BLOOD PRESSURE: 90 MMHG

## 2024-04-10 PROCEDURE — G0158 HHC OT ASSISTANT EA 15: HCPCS

## 2024-04-17 ENCOUNTER — HOME CARE VISIT (OUTPATIENT)
Dept: SCHEDULING | Facility: HOME HEALTH | Age: 54
End: 2024-04-17
Payer: MEDICAID

## 2024-04-17 PROCEDURE — G0152 HHCP-SERV OF OT,EA 15 MIN: HCPCS

## 2024-04-18 VITALS
DIASTOLIC BLOOD PRESSURE: 62 MMHG | SYSTOLIC BLOOD PRESSURE: 110 MMHG | HEART RATE: 93 BPM | OXYGEN SATURATION: 99 % | TEMPERATURE: 98.4 F | RESPIRATION RATE: 16 BRPM

## 2024-04-18 ASSESSMENT — ENCOUNTER SYMPTOMS
PAIN LOCATION - PAIN QUALITY: ACHEY/SORE
DOUBLE VISION: 0

## 2024-06-23 ENCOUNTER — APPOINTMENT (OUTPATIENT)
Dept: GENERAL RADIOLOGY | Age: 54
End: 2024-06-23
Payer: MEDICAID

## 2024-06-23 ENCOUNTER — HOSPITAL ENCOUNTER (EMERGENCY)
Age: 54
Discharge: HOME | End: 2024-06-23
Attending: EMERGENCY MEDICINE
Payer: MEDICAID

## 2024-06-23 VITALS
WEIGHT: 141.4 LBS | OXYGEN SATURATION: 100 % | DIASTOLIC BLOOD PRESSURE: 74 MMHG | HEART RATE: 105 BPM | BODY MASS INDEX: 25.05 KG/M2 | SYSTOLIC BLOOD PRESSURE: 142 MMHG | TEMPERATURE: 97.7 F | RESPIRATION RATE: 20 BRPM

## 2024-06-23 DIAGNOSIS — T14.8XXA SKIN ABRASION: ICD-10-CM

## 2024-06-23 DIAGNOSIS — F69 BEHAVIOR CONCERN IN ADULT: Primary | ICD-10-CM

## 2024-06-23 DIAGNOSIS — S60.229A CONTUSION OF HAND, UNSPECIFIED LATERALITY, INITIAL ENCOUNTER: ICD-10-CM

## 2024-06-23 PROCEDURE — 99283 EMERGENCY DEPT VISIT LOW MDM: CPT

## 2024-06-23 PROCEDURE — 73130 X-RAY EXAM OF HAND: CPT

## 2024-06-23 ASSESSMENT — LIFESTYLE VARIABLES
HOW OFTEN DO YOU HAVE A DRINK CONTAINING ALCOHOL: NEVER
HOW MANY STANDARD DRINKS CONTAINING ALCOHOL DO YOU HAVE ON A TYPICAL DAY: PATIENT DOES NOT DRINK

## 2024-06-23 ASSESSMENT — PAIN - FUNCTIONAL ASSESSMENT: PAIN_FUNCTIONAL_ASSESSMENT: NONE - DENIES PAIN

## 2024-06-23 NOTE — ED PROVIDER NOTES
Emergency Department Provider Note       PCP: Shari Jeter MD   Age: 54 y.o.   Sex: female     DISPOSITION Decision To Discharge 06/23/2024 05:13:20 PM       ICD-10-CM    1. Behavior concern in adult  F69       2. Contusion of hand, unspecified laterality, initial encounter  S60.229A           Medical Decision Making     Patient's mood and behavior are normal here.  Will consult psychiatry for possible medication changes.    5:04 PM EDT  Patient seen by psychiatry.  Patient remains calm and stable for discharge.  No obvious medication changes needed.  I did obtain x-rays of bilateral hands that shows no fracture or dislocation.    5:13 PM EDT  X-rays are reassuring.  Will discharge home     1 chronic illness with exacerbation.  1 acute, uncomplicated illness or injury.  Over the counter drug management performed.  Shared medical decision making was utilized in creating the patients health plan today.    I independently ordered and reviewed each unique test.  I reviewed external records: ED visit note from an outside group.  I reviewed external records: provider visit note from PCP.  I reviewed external records: provider visit note from outside specialist.  I reviewed external records: previous EKG including cardiologist interpretation.    I reviewed external records: previous lab results from outside ED.  I reviewed external records: previous imaging study including radiologist interpretation.     I interpreted the X-rays no obvious fracture or dislocation.              History     Patient presents the ER with complaints of agitated behavior.  She resides at a group home and apparently she was more verbally aggressive with staff there.  They sent her to the ER for a psychiatric evaluation.  Patient herself has no complaints.  States she knows that she was sent to the ER for \"acting up\".  Denies any physical harm to anyone.  Has no complaints.    The history is provided by the patient.   Mental Health

## 2024-06-23 NOTE — DISCHARGE INSTRUCTIONS
Keep wounds clean and dry  Follow-up with primary care physician  Return to the ER for any new, worsening or life-threatening symptom

## 2024-06-23 NOTE — ED NOTES
Success  A new connect request was successfully created for:  Kenia Levy  : 1970  MRN: [id number]  ConnectID: [id number]  REASON:  Initial Evaluation  ACUITY:  Emergent  SUBMITTED:  2024 14:41 EDT

## 2024-06-23 NOTE — ED TRIAGE NOTES
Pt arrives by ems they state she has had some \"agitation and sent her out for pysch eval\". Denies si or hi. Ems states she has been calm and cooperative for transport. . When asked, pt states she \"got upset and started throwing things today\" states she \"feels good now, no pain anywhere\".

## 2025-05-06 ENCOUNTER — HOSPITAL ENCOUNTER (EMERGENCY)
Age: 55
Discharge: HOME | End: 2025-05-06
Attending: STUDENT IN AN ORGANIZED HEALTH CARE EDUCATION/TRAINING PROGRAM
Payer: MEDICAID

## 2025-05-06 VITALS
HEART RATE: 70 BPM | OXYGEN SATURATION: 100 % | BODY MASS INDEX: 24.98 KG/M2 | TEMPERATURE: 97.5 F | DIASTOLIC BLOOD PRESSURE: 74 MMHG | WEIGHT: 141 LBS | RESPIRATION RATE: 20 BRPM | SYSTOLIC BLOOD PRESSURE: 135 MMHG | HEIGHT: 63 IN

## 2025-05-06 DIAGNOSIS — R45.88 NONSUICIDAL SELF-INJURY (HCC): ICD-10-CM

## 2025-05-06 DIAGNOSIS — R45.86 MOOD SWINGS: Primary | ICD-10-CM

## 2025-05-06 PROCEDURE — 99283 EMERGENCY DEPT VISIT LOW MDM: CPT

## 2025-05-06 ASSESSMENT — PAIN - FUNCTIONAL ASSESSMENT
PAIN_FUNCTIONAL_ASSESSMENT: 0-10
PAIN_FUNCTIONAL_ASSESSMENT: 0-10

## 2025-05-06 ASSESSMENT — PAIN SCALES - GENERAL
PAINLEVEL_OUTOF10: 0
PAINLEVEL_OUTOF10: 0

## 2025-05-06 NOTE — ED TRIAGE NOTES
Per ems called to home for anger complications. States last night disagreement with family. Afterwards approximately 2100 struck head against door and breaking glass. Abrasions noted. Patient hx of schizophrenia. Calm at this time. States  would like patient medication evaluated.

## 2025-05-06 NOTE — DISCHARGE INSTRUCTIONS
Continue taking your medications as prescribed.  Follow-up with your counselor and psychiatrist as scheduled.  Return to the ER for worsening or worrisome symptoms.

## 2025-05-06 NOTE — ED PROVIDER NOTES
Emergency Department Provider Note       [facility] EMERGENCY DEPT   PCP: Shari Jeter MD   Age: 54 y.o.   Sex: female     DISPOSITION Decision To Discharge 05/06/2025 10:17:03 AM    ICD-10-CM    1. Mood swings  R45.86       2. Nonsuicidal self-injury (HCC)  R45.88           Medical Decision Making     54-year-old female presents emergency department via EMS from group home.  Patient reports getting upset yesterday and last night and hitting her head against the wall.  Is not on blood thinners.  Did not lose consciousness.  Denies numbness or weakness in extremities.   did discuss with staff at patient facility, requested patient be evaluated by psychiatry.  Patient has no suicidal or homicidal thoughts.  She is not psychotic.  Patient is pleasant.  Normal neurologic exam.  States she does understand that she should not be hitting her head against a wall but states she uses a coping mechanism.  Reports she will try hard not to do this again.  States she does follow with a psychiatrist and counselor.  Patient again with a normal logic exam, no indication for any CT scans.  No neck pain.  Again she has remained stable, will discharge back to her facility with outpatient follow-up.  Patient facility was updated by case management.     1 acute, uncomplicated illness or injury.  Shared medical decision making was utilized in creating the patients health plan today.  I independently ordered and reviewed each unique test.    I reviewed external records: ED visit note from a different ED.                      History     54-year-old female presents emergency department via EMS from group home.  Patient reports getting upset yesterday and last night and hitting her head against the wall.  Is not on blood thinners.  Did not lose consciousness.  Denies numbness or weakness in extremities.   did discuss with staff at patient facility, requested patient be evaluated by psychiatry.  Patient has no suicidal

## 2025-05-06 NOTE — CARE COORDINATION
Patient moved to new group [facility] on 4/20 located at [address] [place] [address].  Group home director is Zandra Orlando [phone].  Patient can be discharged back to this home when medically cleared.  They are advised to follow up with PCP re: medication adjustments.